# Patient Record
Sex: FEMALE | Race: WHITE | NOT HISPANIC OR LATINO | URBAN - METROPOLITAN AREA
[De-identification: names, ages, dates, MRNs, and addresses within clinical notes are randomized per-mention and may not be internally consistent; named-entity substitution may affect disease eponyms.]

---

## 2017-02-17 ENCOUNTER — AMBULATORY - HEALTHEAST (OUTPATIENT)
Dept: CARDIOLOGY | Facility: CLINIC | Age: 82
End: 2017-02-17

## 2017-02-17 ENCOUNTER — COMMUNICATION - HEALTHEAST (OUTPATIENT)
Dept: ADMINISTRATIVE | Facility: CLINIC | Age: 82
End: 2017-02-17

## 2017-02-17 DIAGNOSIS — I25.5 ISCHEMIC CARDIOMYOPATHY: ICD-10-CM

## 2017-02-17 DIAGNOSIS — I25.10 CAD (CORONARY ARTERY DISEASE): ICD-10-CM

## 2017-02-17 DIAGNOSIS — I48.91 A-FIB (H): ICD-10-CM

## 2017-03-29 ENCOUNTER — RECORDS - HEALTHEAST (OUTPATIENT)
Dept: LAB | Facility: CLINIC | Age: 82
End: 2017-03-29

## 2017-03-30 LAB — HBA1C MFR BLD: 5.8 % (ref 4.2–6.1)

## 2017-10-13 ENCOUNTER — RECORDS - HEALTHEAST (OUTPATIENT)
Dept: LAB | Facility: CLINIC | Age: 82
End: 2017-10-13

## 2017-10-16 LAB — HBA1C MFR BLD: 6.3 % (ref 4.2–6.1)

## 2018-01-24 ENCOUNTER — RECORDS - HEALTHEAST (OUTPATIENT)
Dept: LAB | Facility: CLINIC | Age: 83
End: 2018-01-24

## 2018-01-25 LAB — HBA1C MFR BLD: 6.7 % (ref 4.2–6.1)

## 2018-02-02 ENCOUNTER — RECORDS - HEALTHEAST (OUTPATIENT)
Dept: LAB | Facility: CLINIC | Age: 83
End: 2018-02-02

## 2018-02-02 LAB
ANION GAP SERPL CALCULATED.3IONS-SCNC: 11 MMOL/L (ref 5–18)
BNP SERPL-MCNC: 486 PG/ML (ref 0–167)
BUN SERPL-MCNC: 14 MG/DL (ref 8–28)
CALCIUM SERPL-MCNC: 8.9 MG/DL (ref 8.5–10.5)
CHLORIDE BLD-SCNC: 102 MMOL/L (ref 98–107)
CO2 SERPL-SCNC: 26 MMOL/L (ref 22–31)
CREAT SERPL-MCNC: 1.07 MG/DL (ref 0.6–1.1)
ERYTHROCYTE [DISTWIDTH] IN BLOOD BY AUTOMATED COUNT: 13.3 % (ref 11–14.5)
GFR SERPL CREATININE-BSD FRML MDRD: 49 ML/MIN/1.73M2
GLUCOSE BLD-MCNC: 131 MG/DL (ref 70–125)
HCT VFR BLD AUTO: 38 % (ref 35–47)
HGB BLD-MCNC: 11.5 G/DL (ref 12–16)
MCH RBC QN AUTO: 30.1 PG (ref 27–34)
MCHC RBC AUTO-ENTMCNC: 30.3 G/DL (ref 32–36)
MCV RBC AUTO: 100 FL (ref 80–100)
PLATELET # BLD AUTO: 227 THOU/UL (ref 140–440)
PMV BLD AUTO: 9.8 FL (ref 8.5–12.5)
POTASSIUM BLD-SCNC: 3.6 MMOL/L (ref 3.5–5)
RBC # BLD AUTO: 3.82 MILL/UL (ref 3.8–5.4)
SODIUM SERPL-SCNC: 139 MMOL/L (ref 136–145)
WBC: 10 THOU/UL (ref 4–11)

## 2018-02-07 ENCOUNTER — RECORDS - HEALTHEAST (OUTPATIENT)
Dept: LAB | Facility: CLINIC | Age: 83
End: 2018-02-07

## 2018-02-08 LAB — TSH SERPL DL<=0.005 MIU/L-ACNC: 5.93 UIU/ML (ref 0.3–5)

## 2018-02-27 ENCOUNTER — RECORDS - HEALTHEAST (OUTPATIENT)
Dept: LAB | Facility: CLINIC | Age: 83
End: 2018-02-27

## 2018-02-27 LAB
25(OH)D3 SERPL-MCNC: 43.5 NG/ML (ref 30–80)
ANION GAP SERPL CALCULATED.3IONS-SCNC: 7 MMOL/L (ref 5–18)
BASOPHILS # BLD AUTO: 0 THOU/UL (ref 0–0.2)
BASOPHILS NFR BLD AUTO: 0 % (ref 0–2)
BNP SERPL-MCNC: 312 PG/ML (ref 0–167)
BUN SERPL-MCNC: 15 MG/DL (ref 8–28)
CALCIUM SERPL-MCNC: 8.6 MG/DL (ref 8.5–10.5)
CHLORIDE BLD-SCNC: 105 MMOL/L (ref 98–107)
CO2 SERPL-SCNC: 26 MMOL/L (ref 22–31)
CREAT SERPL-MCNC: 1.02 MG/DL (ref 0.6–1.1)
EOSINOPHIL # BLD AUTO: 0.2 THOU/UL (ref 0–0.4)
EOSINOPHIL NFR BLD AUTO: 4 % (ref 0–6)
ERYTHROCYTE [DISTWIDTH] IN BLOOD BY AUTOMATED COUNT: 14 % (ref 11–14.5)
GFR SERPL CREATININE-BSD FRML MDRD: 52 ML/MIN/1.73M2
GLUCOSE BLD-MCNC: 88 MG/DL (ref 70–125)
HCT VFR BLD AUTO: 36.1 % (ref 35–47)
HGB BLD-MCNC: 11.5 G/DL (ref 12–16)
LYMPHOCYTES # BLD AUTO: 1.7 THOU/UL (ref 0.8–4.4)
LYMPHOCYTES NFR BLD AUTO: 32 % (ref 20–40)
MCH RBC QN AUTO: 30.3 PG (ref 27–34)
MCHC RBC AUTO-ENTMCNC: 31.9 G/DL (ref 32–36)
MCV RBC AUTO: 95 FL (ref 80–100)
MONOCYTES # BLD AUTO: 0.4 THOU/UL (ref 0–0.9)
MONOCYTES NFR BLD AUTO: 8 % (ref 2–10)
NEUTROPHILS # BLD AUTO: 2.9 THOU/UL (ref 2–7.7)
NEUTROPHILS NFR BLD AUTO: 56 % (ref 50–70)
OVALOCYTES: ABNORMAL
PLAT MORPH BLD: ABNORMAL
PLATELET # BLD AUTO: 134 THOU/UL (ref 140–440)
PMV BLD AUTO: 10.3 FL (ref 8.5–12.5)
POTASSIUM BLD-SCNC: 4.2 MMOL/L (ref 3.5–5)
RBC # BLD AUTO: 3.79 MILL/UL (ref 3.8–5.4)
SODIUM SERPL-SCNC: 138 MMOL/L (ref 136–145)
VIT B12 SERPL-MCNC: 399 PG/ML (ref 213–816)
WBC: 5.2 THOU/UL (ref 4–11)

## 2018-04-25 ENCOUNTER — RECORDS - HEALTHEAST (OUTPATIENT)
Dept: LAB | Facility: CLINIC | Age: 83
End: 2018-04-25

## 2018-04-26 LAB — TSH SERPL DL<=0.005 MIU/L-ACNC: 6.01 UIU/ML (ref 0.3–5)

## 2018-06-07 ENCOUNTER — RECORDS - HEALTHEAST (OUTPATIENT)
Dept: LAB | Facility: CLINIC | Age: 83
End: 2018-06-07

## 2018-06-07 LAB — TSH SERPL DL<=0.005 MIU/L-ACNC: 6.72 UIU/ML (ref 0.3–5)

## 2018-06-08 ENCOUNTER — RECORDS - HEALTHEAST (OUTPATIENT)
Dept: LAB | Facility: CLINIC | Age: 83
End: 2018-06-08

## 2018-06-08 LAB
ANION GAP SERPL CALCULATED.3IONS-SCNC: 7 MMOL/L (ref 5–18)
BNP SERPL-MCNC: 464 PG/ML (ref 0–167)
BUN SERPL-MCNC: 19 MG/DL (ref 8–28)
CALCIUM SERPL-MCNC: 8.6 MG/DL (ref 8.5–10.5)
CHLORIDE BLD-SCNC: 107 MMOL/L (ref 98–107)
CO2 SERPL-SCNC: 28 MMOL/L (ref 22–31)
CREAT SERPL-MCNC: 1.14 MG/DL (ref 0.6–1.1)
GFR SERPL CREATININE-BSD FRML MDRD: 46 ML/MIN/1.73M2
GLUCOSE BLD-MCNC: 110 MG/DL (ref 70–125)
POTASSIUM BLD-SCNC: 4.1 MMOL/L (ref 3.5–5)
SODIUM SERPL-SCNC: 142 MMOL/L (ref 136–145)
T3 SERPL-MCNC: 77 NG/DL (ref 45–175)
T4 FREE SERPL-MCNC: 0.8 NG/DL (ref 0.7–1.8)
T4 TOTAL - HISTORICAL: 4.6 UG/DL (ref 4.5–13)
TSH SERPL DL<=0.005 MIU/L-ACNC: 6.3 UIU/ML (ref 0.3–5)
TSH SERPL DL<=0.005 MIU/L-ACNC: 6.3 UIU/ML (ref 0.3–5)

## 2018-08-15 ENCOUNTER — RECORDS - HEALTHEAST (OUTPATIENT)
Dept: LAB | Facility: CLINIC | Age: 83
End: 2018-08-15

## 2018-08-16 LAB — TSH SERPL DL<=0.005 MIU/L-ACNC: 14.78 UIU/ML (ref 0.3–5)

## 2018-08-17 LAB — HBA1C MFR BLD: 7.5 % (ref 4.2–6.1)

## 2018-08-27 ENCOUNTER — RECORDS - HEALTHEAST (OUTPATIENT)
Dept: LAB | Facility: CLINIC | Age: 83
End: 2018-08-27

## 2018-08-27 LAB
ANION GAP SERPL CALCULATED.3IONS-SCNC: 7 MMOL/L (ref 5–18)
BASOPHILS # BLD AUTO: 0 THOU/UL (ref 0–0.2)
BASOPHILS NFR BLD AUTO: 1 % (ref 0–2)
BUN SERPL-MCNC: 19 MG/DL (ref 8–28)
CALCIUM SERPL-MCNC: 9.4 MG/DL (ref 8.5–10.5)
CHLORIDE BLD-SCNC: 105 MMOL/L (ref 98–107)
CO2 SERPL-SCNC: 30 MMOL/L (ref 22–31)
CREAT SERPL-MCNC: 1.19 MG/DL (ref 0.6–1.1)
EOSINOPHIL # BLD AUTO: 0.4 THOU/UL (ref 0–0.4)
EOSINOPHIL NFR BLD AUTO: 6 % (ref 0–6)
ERYTHROCYTE [DISTWIDTH] IN BLOOD BY AUTOMATED COUNT: 14.6 % (ref 11–14.5)
GFR SERPL CREATININE-BSD FRML MDRD: 43 ML/MIN/1.73M2
GLUCOSE BLD-MCNC: 250 MG/DL (ref 70–125)
HCT VFR BLD AUTO: 39.9 % (ref 35–47)
HGB BLD-MCNC: 12.1 G/DL (ref 12–16)
LYMPHOCYTES # BLD AUTO: 1.6 THOU/UL (ref 0.8–4.4)
LYMPHOCYTES NFR BLD AUTO: 23 % (ref 20–40)
MCH RBC QN AUTO: 29.6 PG (ref 27–34)
MCHC RBC AUTO-ENTMCNC: 30.3 G/DL (ref 32–36)
MCV RBC AUTO: 98 FL (ref 80–100)
MONOCYTES # BLD AUTO: 0.6 THOU/UL (ref 0–0.9)
MONOCYTES NFR BLD AUTO: 8 % (ref 2–10)
NEUTROPHILS # BLD AUTO: 4.5 THOU/UL (ref 2–7.7)
NEUTROPHILS NFR BLD AUTO: 63 % (ref 50–70)
PLATELET # BLD AUTO: 201 THOU/UL (ref 140–440)
PMV BLD AUTO: 10.3 FL (ref 8.5–12.5)
POTASSIUM BLD-SCNC: 4 MMOL/L (ref 3.5–5)
RBC # BLD AUTO: 4.09 MILL/UL (ref 3.8–5.4)
SODIUM SERPL-SCNC: 142 MMOL/L (ref 136–145)
WBC: 7.2 THOU/UL (ref 4–11)

## 2018-09-26 ENCOUNTER — COMMUNICATION - HEALTHEAST (OUTPATIENT)
Dept: NEUROSURGERY | Facility: CLINIC | Age: 83
End: 2018-09-26

## 2018-09-26 DIAGNOSIS — S22.009S MULT FRACTURES OF THORACIC SPINE, CLOSED, SEQUELA: ICD-10-CM

## 2018-09-27 ENCOUNTER — OFFICE VISIT - HEALTHEAST (OUTPATIENT)
Dept: GERIATRICS | Facility: CLINIC | Age: 83
End: 2018-09-27

## 2018-09-27 DIAGNOSIS — E11.42 DIABETIC POLYNEUROPATHY ASSOCIATED WITH TYPE 2 DIABETES MELLITUS (H): ICD-10-CM

## 2018-09-27 DIAGNOSIS — F32.4 MAJOR DEPRESSIVE DISORDER WITH SINGLE EPISODE, IN PARTIAL REMISSION (H): ICD-10-CM

## 2018-09-27 DIAGNOSIS — I10 HYPERTENSION: ICD-10-CM

## 2018-09-27 DIAGNOSIS — I25.10 CORONARY ATHEROSCLEROSIS: ICD-10-CM

## 2018-09-27 DIAGNOSIS — H34.9 RETINAL ARTERY OCCLUSION: ICD-10-CM

## 2018-09-27 DIAGNOSIS — S22.050A: ICD-10-CM

## 2018-09-27 DIAGNOSIS — S42.201D CLOSED FRACTURE OF PROXIMAL END OF RIGHT HUMERUS WITH ROUTINE HEALING, UNSPECIFIED FRACTURE MORPHOLOGY, SUBSEQUENT ENCOUNTER: ICD-10-CM

## 2018-09-27 DIAGNOSIS — I50.22 CHRONIC SYSTOLIC HEART FAILURE (H): ICD-10-CM

## 2018-09-27 DIAGNOSIS — E11.65 TYPE 2 DIABETES MELLITUS WITH HYPERGLYCEMIA, WITHOUT LONG-TERM CURRENT USE OF INSULIN (H): ICD-10-CM

## 2018-09-28 ENCOUNTER — OFFICE VISIT - HEALTHEAST (OUTPATIENT)
Dept: GERIATRICS | Facility: CLINIC | Age: 83
End: 2018-09-28

## 2018-09-28 DIAGNOSIS — F32.4 MAJOR DEPRESSIVE DISORDER WITH SINGLE EPISODE, IN PARTIAL REMISSION (H): ICD-10-CM

## 2018-09-28 DIAGNOSIS — R53.81 PHYSICAL DECONDITIONING: ICD-10-CM
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
## 2018-09-28 DIAGNOSIS — I25.10 CORONARY ATHEROSCLEROSIS: ICD-10-CM

## 2018-09-28 DIAGNOSIS — E11.9 DM (DIABETES MELLITUS) (H): ICD-10-CM
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
## 2018-09-28 DIAGNOSIS — I10 HYPERTENSION: ICD-10-CM

## 2018-09-28 DIAGNOSIS — E11.42 DIABETIC POLYNEUROPATHY ASSOCIATED WITH TYPE 2 DIABETES MELLITUS (H): ICD-10-CM

## 2018-09-28 DIAGNOSIS — E87.6 HYPOKALEMIA: ICD-10-CM

## 2018-09-28 DIAGNOSIS — J44.9 COPD (CHRONIC OBSTRUCTIVE PULMONARY DISEASE) (H): ICD-10-CM

## 2018-09-28 DIAGNOSIS — N18.2 CHRONIC KIDNEY DISEASE, STAGE II (MILD): ICD-10-CM

## 2018-09-28 DIAGNOSIS — S42.201D CLOSED FRACTURE OF PROXIMAL END OF RIGHT HUMERUS WITH ROUTINE HEALING, UNSPECIFIED FRACTURE MORPHOLOGY, SUBSEQUENT ENCOUNTER: ICD-10-CM

## 2018-09-28 DIAGNOSIS — I50.22 CHRONIC SYSTOLIC HEART FAILURE (H): ICD-10-CM

## 2018-09-28 DIAGNOSIS — K58.9 IRRITABLE BOWEL SYNDROME: ICD-10-CM

## 2018-09-28 DIAGNOSIS — I25.2 OLD MYOCARDIAL INFARCTION: ICD-10-CM

## 2018-09-28 DIAGNOSIS — S22.000A CLOSED COMPRESSION FRACTURE OF THORACIC VERTEBRA, INITIAL ENCOUNTER (H): ICD-10-CM

## 2018-09-28 DIAGNOSIS — D50.9 IRON DEFICIENCY ANEMIA: ICD-10-CM

## 2018-10-01 ENCOUNTER — RECORDS - HEALTHEAST (OUTPATIENT)
Dept: LAB | Facility: CLINIC | Age: 83
End: 2018-10-01

## 2018-10-01 LAB
25(OH)D3 SERPL-MCNC: 32.7 NG/ML (ref 30–80)
ANION GAP SERPL CALCULATED.3IONS-SCNC: 11 MMOL/L (ref 5–18)
BASOPHILS # BLD AUTO: 0 THOU/UL (ref 0–0.2)
BASOPHILS NFR BLD AUTO: 0 % (ref 0–2)
BUN SERPL-MCNC: 23 MG/DL (ref 8–28)
CALCIUM SERPL-MCNC: 9 MG/DL (ref 8.5–10.5)
CHLORIDE BLD-SCNC: 103 MMOL/L (ref 98–107)
CO2 SERPL-SCNC: 27 MMOL/L (ref 22–31)
CREAT SERPL-MCNC: 0.99 MG/DL (ref 0.6–1.1)
EOSINOPHIL # BLD AUTO: 0.3 THOU/UL (ref 0–0.4)
EOSINOPHIL NFR BLD AUTO: 4 % (ref 0–6)
ERYTHROCYTE [DISTWIDTH] IN BLOOD BY AUTOMATED COUNT: 15.6 % (ref 11–14.5)
GFR SERPL CREATININE-BSD FRML MDRD: 53 ML/MIN/1.73M2
GLUCOSE BLD-MCNC: 124 MG/DL (ref 70–125)
HCT VFR BLD AUTO: 33.2 % (ref 35–47)
HGB BLD-MCNC: 10.4 G/DL (ref 12–16)
LYMPHOCYTES # BLD AUTO: 1.8 THOU/UL (ref 0.8–4.4)
LYMPHOCYTES NFR BLD AUTO: 24 % (ref 20–40)
MCH RBC QN AUTO: 30.1 PG (ref 27–34)
MCHC RBC AUTO-ENTMCNC: 31.3 G/DL (ref 32–36)
MCV RBC AUTO: 96 FL (ref 80–100)
MONOCYTES # BLD AUTO: 0.7 THOU/UL (ref 0–0.9)
MONOCYTES NFR BLD AUTO: 9 % (ref 2–10)
NEUTROPHILS # BLD AUTO: 4.7 THOU/UL (ref 2–7.7)
NEUTROPHILS NFR BLD AUTO: 63 % (ref 50–70)
PLATELET # BLD AUTO: 250 THOU/UL (ref 140–440)
PMV BLD AUTO: 10 FL (ref 8.5–12.5)
POTASSIUM BLD-SCNC: 4.3 MMOL/L (ref 3.5–5)
RBC # BLD AUTO: 3.45 MILL/UL (ref 3.8–5.4)
SODIUM SERPL-SCNC: 141 MMOL/L (ref 136–145)
WBC: 7.6 THOU/UL (ref 4–11)

## 2018-10-02 ENCOUNTER — RECORDS - HEALTHEAST (OUTPATIENT)
Dept: LAB | Facility: CLINIC | Age: 83
End: 2018-10-02

## 2018-10-02 ENCOUNTER — OFFICE VISIT - HEALTHEAST (OUTPATIENT)
Dept: GERIATRICS | Facility: CLINIC | Age: 83
End: 2018-10-02

## 2018-10-02 DIAGNOSIS — I50.22 CHRONIC SYSTOLIC HEART FAILURE (H): ICD-10-CM

## 2018-10-02 DIAGNOSIS — I25.10 CORONARY ATHEROSCLEROSIS: ICD-10-CM

## 2018-10-02 DIAGNOSIS — D50.9 IRON DEFICIENCY ANEMIA: ICD-10-CM

## 2018-10-02 DIAGNOSIS — S42.201D CLOSED FRACTURE OF PROXIMAL END OF RIGHT HUMERUS WITH ROUTINE HEALING, UNSPECIFIED FRACTURE MORPHOLOGY, SUBSEQUENT ENCOUNTER: ICD-10-CM

## 2018-10-02 DIAGNOSIS — E11.65 TYPE 2 DIABETES MELLITUS WITH HYPERGLYCEMIA, WITHOUT LONG-TERM CURRENT USE OF INSULIN (H): ICD-10-CM

## 2018-10-02 DIAGNOSIS — I10 HYPERTENSION: ICD-10-CM
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
## 2018-10-02 DIAGNOSIS — E11.42 DIABETIC POLYNEUROPATHY ASSOCIATED WITH TYPE 2 DIABETES MELLITUS (H): ICD-10-CM

## 2018-10-02 DIAGNOSIS — N18.2 CHRONIC KIDNEY DISEASE, STAGE II (MILD): ICD-10-CM

## 2018-10-02 DIAGNOSIS — I48.91 ATRIAL FIBRILLATION (H): ICD-10-CM

## 2018-10-02 DIAGNOSIS — K58.9 IRRITABLE BOWEL SYNDROME: ICD-10-CM
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
## 2018-10-02 DIAGNOSIS — H40.9 GLAUCOMA: ICD-10-CM

## 2018-10-02 DIAGNOSIS — F32.4 MAJOR DEPRESSIVE DISORDER WITH SINGLE EPISODE, IN PARTIAL REMISSION (H): ICD-10-CM

## 2018-10-02 DIAGNOSIS — R53.81 PHYSICAL DECONDITIONING: ICD-10-CM

## 2018-10-02 DIAGNOSIS — S22.000A CLOSED COMPRESSION FRACTURE OF THORACIC VERTEBRA, INITIAL ENCOUNTER (H): ICD-10-CM

## 2018-10-02 DIAGNOSIS — J44.9 COPD (CHRONIC OBSTRUCTIVE PULMONARY DISEASE) (H): ICD-10-CM

## 2018-10-02 LAB — TSH SERPL DL<=0.005 MIU/L-ACNC: 2.73 UIU/ML (ref 0.3–5)

## 2018-10-04 ENCOUNTER — OFFICE VISIT - HEALTHEAST (OUTPATIENT)
Dept: GERIATRICS | Facility: CLINIC | Age: 83
End: 2018-10-04
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
## 2018-10-04 DIAGNOSIS — D50.9 IRON DEFICIENCY ANEMIA: ICD-10-CM

## 2018-10-04 DIAGNOSIS — N18.2 CHRONIC KIDNEY DISEASE, STAGE II (MILD): ICD-10-CM

## 2018-10-04 DIAGNOSIS — J44.9 COPD (CHRONIC OBSTRUCTIVE PULMONARY DISEASE) (H): ICD-10-CM

## 2018-10-04 DIAGNOSIS — S42.201D CLOSED FRACTURE OF PROXIMAL END OF RIGHT HUMERUS WITH ROUTINE HEALING, UNSPECIFIED FRACTURE MORPHOLOGY, SUBSEQUENT ENCOUNTER: ICD-10-CM

## 2018-10-04 DIAGNOSIS — E11.65 TYPE 2 DIABETES MELLITUS WITH HYPERGLYCEMIA, WITHOUT LONG-TERM CURRENT USE OF INSULIN (H): ICD-10-CM

## 2018-10-04 DIAGNOSIS — I25.2 OLD MYOCARDIAL INFARCTION: ICD-10-CM

## 2018-10-04 DIAGNOSIS — R53.81 PHYSICAL DECONDITIONING: ICD-10-CM
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
## 2018-10-04 DIAGNOSIS — I48.91 ATRIAL FIBRILLATION (H): ICD-10-CM

## 2018-10-04 DIAGNOSIS — I25.10 CORONARY ATHEROSCLEROSIS: ICD-10-CM

## 2018-10-04 DIAGNOSIS — S22.000A CLOSED COMPRESSION FRACTURE OF THORACIC VERTEBRA, INITIAL ENCOUNTER (H): ICD-10-CM

## 2018-10-04 DIAGNOSIS — I50.22 CHRONIC SYSTOLIC HEART FAILURE (H): ICD-10-CM

## 2018-10-04 DIAGNOSIS — I10 HYPERTENSION: ICD-10-CM

## 2018-10-09 ENCOUNTER — OFFICE VISIT - HEALTHEAST (OUTPATIENT)
Dept: GERIATRICS | Facility: CLINIC | Age: 83
End: 2018-10-09

## 2018-10-09 DIAGNOSIS — J44.9 COPD (CHRONIC OBSTRUCTIVE PULMONARY DISEASE) (H): ICD-10-CM

## 2018-10-09 DIAGNOSIS — I25.10 CORONARY ATHEROSCLEROSIS: ICD-10-CM

## 2018-10-09 DIAGNOSIS — I10 HYPERTENSION: ICD-10-CM

## 2018-10-09 DIAGNOSIS — F32.4 MAJOR DEPRESSIVE DISORDER WITH SINGLE EPISODE, IN PARTIAL REMISSION (H): ICD-10-CM
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
## 2018-10-09 DIAGNOSIS — I50.22 CHRONIC SYSTOLIC HEART FAILURE (H): ICD-10-CM

## 2018-10-09 DIAGNOSIS — S42.201D CLOSED FRACTURE OF PROXIMAL END OF RIGHT HUMERUS WITH ROUTINE HEALING, UNSPECIFIED FRACTURE MORPHOLOGY, SUBSEQUENT ENCOUNTER: ICD-10-CM

## 2018-10-09 DIAGNOSIS — E11.65 TYPE 2 DIABETES MELLITUS WITH HYPERGLYCEMIA, WITHOUT LONG-TERM CURRENT USE OF INSULIN (H): ICD-10-CM

## 2018-10-09 DIAGNOSIS — S22.000A CLOSED COMPRESSION FRACTURE OF THORACIC VERTEBRA, INITIAL ENCOUNTER (H): ICD-10-CM

## 2018-10-09 DIAGNOSIS — K58.9 IRRITABLE BOWEL SYNDROME: ICD-10-CM

## 2018-10-09 DIAGNOSIS — N18.2 CHRONIC KIDNEY DISEASE, STAGE II (MILD): ICD-10-CM
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
## 2018-10-09 DIAGNOSIS — R53.81 PHYSICAL DECONDITIONING: ICD-10-CM

## 2018-10-11 ENCOUNTER — OFFICE VISIT - HEALTHEAST (OUTPATIENT)
Dept: GERIATRICS | Facility: CLINIC | Age: 83
End: 2018-10-11

## 2018-10-11 DIAGNOSIS — R53.81 PHYSICAL DECONDITIONING: ICD-10-CM

## 2018-10-11 DIAGNOSIS — T14.8XXA EXCORIATION: ICD-10-CM
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
## 2018-10-11 DIAGNOSIS — K58.9 IRRITABLE BOWEL SYNDROME: ICD-10-CM

## 2018-10-11 DIAGNOSIS — I48.91 ATRIAL FIBRILLATION (H): ICD-10-CM
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
## 2018-10-11 DIAGNOSIS — J44.9 COPD (CHRONIC OBSTRUCTIVE PULMONARY DISEASE) (H): ICD-10-CM

## 2018-10-11 DIAGNOSIS — S22.000A CLOSED COMPRESSION FRACTURE OF THORACIC VERTEBRA, INITIAL ENCOUNTER (H): ICD-10-CM

## 2018-10-11 DIAGNOSIS — S42.201D CLOSED FRACTURE OF PROXIMAL END OF RIGHT HUMERUS WITH ROUTINE HEALING, UNSPECIFIED FRACTURE MORPHOLOGY, SUBSEQUENT ENCOUNTER: ICD-10-CM

## 2018-10-11 DIAGNOSIS — I50.22 CHRONIC SYSTOLIC HEART FAILURE (H): ICD-10-CM

## 2018-10-11 DIAGNOSIS — F32.4 MAJOR DEPRESSIVE DISORDER WITH SINGLE EPISODE, IN PARTIAL REMISSION (H): ICD-10-CM

## 2018-10-11 DIAGNOSIS — E11.65 TYPE 2 DIABETES MELLITUS WITH HYPERGLYCEMIA, WITHOUT LONG-TERM CURRENT USE OF INSULIN (H): ICD-10-CM

## 2018-10-11 DIAGNOSIS — I10 HYPERTENSION: ICD-10-CM

## 2018-10-11 DIAGNOSIS — I25.10 CORONARY ATHEROSCLEROSIS: ICD-10-CM

## 2018-10-11 DIAGNOSIS — N18.2 CHRONIC KIDNEY DISEASE, STAGE II (MILD): ICD-10-CM

## 2018-10-11 DIAGNOSIS — E11.42 DIABETIC POLYNEUROPATHY ASSOCIATED WITH TYPE 2 DIABETES MELLITUS (H): ICD-10-CM

## 2018-10-12 ENCOUNTER — HOSPITAL ENCOUNTER (OUTPATIENT)
Dept: RADIOLOGY | Facility: CLINIC | Age: 83
Discharge: HOME | End: 2018-10-12
Attending: SURGERY

## 2018-10-12 ENCOUNTER — OFFICE VISIT - HEALTHEAST (OUTPATIENT)
Dept: GERIATRICS | Facility: CLINIC | Age: 83
End: 2018-10-12

## 2018-10-12 ENCOUNTER — OFFICE VISIT - HEALTHEAST (OUTPATIENT)
Dept: NEUROSURGERY | Facility: CLINIC | Age: 83
End: 2018-10-12
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
## 2018-10-12 DIAGNOSIS — J44.9 COPD (CHRONIC OBSTRUCTIVE PULMONARY DISEASE) (H): ICD-10-CM

## 2018-10-12 DIAGNOSIS — E44.0 MODERATE MALNUTRITION (H): ICD-10-CM

## 2018-10-12 DIAGNOSIS — S22.000A CLOSED COMPRESSION FRACTURE OF THORACIC VERTEBRA, INITIAL ENCOUNTER (H): ICD-10-CM

## 2018-10-12 DIAGNOSIS — R53.81 PHYSICAL DECONDITIONING: ICD-10-CM

## 2018-10-12 DIAGNOSIS — S22.000D CLOSED COMPRESSION FRACTURE OF THORACIC VERTEBRA WITH ROUTINE HEALING, SUBSEQUENT ENCOUNTER: ICD-10-CM
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
## 2018-10-12 DIAGNOSIS — I10 HYPERTENSION: ICD-10-CM

## 2018-10-12 DIAGNOSIS — I48.91 ATRIAL FIBRILLATION (H): ICD-10-CM

## 2018-10-12 DIAGNOSIS — N18.2 CHRONIC KIDNEY DISEASE, STAGE II (MILD): ICD-10-CM

## 2018-10-12 DIAGNOSIS — E78.5 DYSLIPIDEMIA, GOAL LDL BELOW 70: ICD-10-CM

## 2018-10-12 DIAGNOSIS — I50.22 CHRONIC SYSTOLIC HEART FAILURE (H): ICD-10-CM

## 2018-10-12 DIAGNOSIS — S22.009S MULT FRACTURES OF THORACIC SPINE, CLOSED, SEQUELA: ICD-10-CM

## 2018-10-12 DIAGNOSIS — S42.201D CLOSED FRACTURE OF PROXIMAL END OF RIGHT HUMERUS WITH ROUTINE HEALING, UNSPECIFIED FRACTURE MORPHOLOGY, SUBSEQUENT ENCOUNTER: ICD-10-CM

## 2018-10-12 ASSESSMENT — MIFFLIN-ST. JEOR: SCORE: 1010.14

## 2018-10-15 ENCOUNTER — AMBULATORY - HEALTHEAST (OUTPATIENT)
Dept: GERIATRICS | Facility: CLINIC | Age: 83
End: 2018-10-15

## 2018-11-09 ENCOUNTER — RECORDS - HEALTHEAST (OUTPATIENT)
Dept: LAB | Facility: CLINIC | Age: 83
End: 2018-11-09

## 2018-11-12 ENCOUNTER — HOSPITAL ENCOUNTER (OUTPATIENT)
Dept: RADIOLOGY | Facility: CLINIC | Age: 83
Discharge: HOME | End: 2018-11-12

## 2018-11-12 ENCOUNTER — COMMUNICATION - HEALTHEAST (OUTPATIENT)
Dept: TELEHEALTH | Facility: CLINIC | Age: 83
End: 2018-11-12

## 2018-11-12 ENCOUNTER — OFFICE VISIT - HEALTHEAST (OUTPATIENT)
Dept: NEUROSURGERY | Facility: CLINIC | Age: 83
End: 2018-11-12

## 2018-11-12 DIAGNOSIS — S22.000D CLOSED COMPRESSION FRACTURE OF THORACIC VERTEBRA WITH ROUTINE HEALING, SUBSEQUENT ENCOUNTER: ICD-10-CM

## 2018-11-12 LAB — TSH SERPL DL<=0.005 MIU/L-ACNC: 3.88 UIU/ML (ref 0.3–5)

## 2018-11-12 ASSESSMENT — MIFFLIN-ST. JEOR: SCORE: 1010.14

## 2019-01-18 ENCOUNTER — RECORDS - HEALTHEAST (OUTPATIENT)
Dept: LAB | Facility: CLINIC | Age: 84
End: 2019-01-18

## 2019-01-21 LAB
ANION GAP SERPL CALCULATED.3IONS-SCNC: 12 MMOL/L (ref 5–18)
BASOPHILS # BLD AUTO: 0.1 THOU/UL (ref 0–0.2)
BASOPHILS NFR BLD AUTO: 1 % (ref 0–2)
BUN SERPL-MCNC: 21 MG/DL (ref 8–28)
CALCIUM SERPL-MCNC: 9.3 MG/DL (ref 8.5–10.5)
CHLORIDE BLD-SCNC: 101 MMOL/L (ref 98–107)
CO2 SERPL-SCNC: 29 MMOL/L (ref 22–31)
CREAT SERPL-MCNC: 1.69 MG/DL (ref 0.6–1.1)
EOSINOPHIL # BLD AUTO: 0.5 THOU/UL (ref 0–0.4)
EOSINOPHIL NFR BLD AUTO: 8 % (ref 0–6)
ERYTHROCYTE [DISTWIDTH] IN BLOOD BY AUTOMATED COUNT: 14.8 % (ref 11–14.5)
GFR SERPL CREATININE-BSD FRML MDRD: 29 ML/MIN/1.73M2
GLUCOSE BLD-MCNC: 174 MG/DL (ref 70–125)
HBA1C MFR BLD: 8 % (ref 4.2–6.1)
HCT VFR BLD AUTO: 39.6 % (ref 35–47)
HGB BLD-MCNC: 11.6 G/DL (ref 12–16)
LYMPHOCYTES # BLD AUTO: 1.4 THOU/UL (ref 0.8–4.4)
LYMPHOCYTES NFR BLD AUTO: 21 % (ref 20–40)
MAGNESIUM SERPL-MCNC: 2 MG/DL (ref 1.8–2.6)
MCH RBC QN AUTO: 26 PG (ref 27–34)
MCHC RBC AUTO-ENTMCNC: 29.3 G/DL (ref 32–36)
MCV RBC AUTO: 89 FL (ref 80–100)
MONOCYTES # BLD AUTO: 0.7 THOU/UL (ref 0–0.9)
MONOCYTES NFR BLD AUTO: 11 % (ref 2–10)
NEUTROPHILS # BLD AUTO: 3.9 THOU/UL (ref 2–7.7)
NEUTROPHILS NFR BLD AUTO: 59 % (ref 50–70)
PLATELET # BLD AUTO: 206 THOU/UL (ref 140–440)
PMV BLD AUTO: 9.9 FL (ref 8.5–12.5)
POTASSIUM BLD-SCNC: 4.2 MMOL/L (ref 3.5–5)
RBC # BLD AUTO: 4.47 MILL/UL (ref 3.8–5.4)
SODIUM SERPL-SCNC: 142 MMOL/L (ref 136–145)
WBC: 6.6 THOU/UL (ref 4–11)

## 2019-03-21 ENCOUNTER — RECORDS - HEALTHEAST (OUTPATIENT)
Dept: LAB | Facility: CLINIC | Age: 84
End: 2019-03-21

## 2019-03-21 LAB
MAGNESIUM SERPL-MCNC: 2.1 MG/DL (ref 1.8–2.6)
POTASSIUM BLD-SCNC: 4.9 MMOL/L (ref 3.5–5)

## 2019-06-17 ENCOUNTER — RECORDS - HEALTHEAST (OUTPATIENT)
Dept: LAB | Facility: CLINIC | Age: 84
End: 2019-06-17

## 2019-06-17 LAB
ANION GAP SERPL CALCULATED.3IONS-SCNC: 9 MMOL/L (ref 5–18)
BUN SERPL-MCNC: 30 MG/DL (ref 8–28)
CALCIUM SERPL-MCNC: 9.7 MG/DL (ref 8.5–10.5)
CHLORIDE BLD-SCNC: 104 MMOL/L (ref 98–107)
CO2 SERPL-SCNC: 28 MMOL/L (ref 22–31)
CREAT SERPL-MCNC: 1.73 MG/DL (ref 0.6–1.1)
ERYTHROCYTE [DISTWIDTH] IN BLOOD BY AUTOMATED COUNT: 16.7 % (ref 11–14.5)
GFR SERPL CREATININE-BSD FRML MDRD: 28 ML/MIN/1.73M2
GLUCOSE BLD-MCNC: 137 MG/DL (ref 70–125)
HBA1C MFR BLD: 6.9 % (ref 4.2–6.1)
HCT VFR BLD AUTO: 37.1 % (ref 35–47)
HGB BLD-MCNC: 10.8 G/DL (ref 12–16)
MCH RBC QN AUTO: 25.6 PG (ref 27–34)
MCHC RBC AUTO-ENTMCNC: 29.1 G/DL (ref 32–36)
MCV RBC AUTO: 88 FL (ref 80–100)
PLATELET # BLD AUTO: 177 THOU/UL (ref 140–440)
PMV BLD AUTO: 10.4 FL (ref 8.5–12.5)
POTASSIUM BLD-SCNC: 4 MMOL/L (ref 3.5–5)
RBC # BLD AUTO: 4.22 MILL/UL (ref 3.8–5.4)
SODIUM SERPL-SCNC: 141 MMOL/L (ref 136–145)
TSH SERPL DL<=0.005 MIU/L-ACNC: 5.89 UIU/ML (ref 0.3–5)
WBC: 4.9 THOU/UL (ref 4–11)

## 2019-08-14 ENCOUNTER — RECORDS - HEALTHEAST (OUTPATIENT)
Dept: LAB | Facility: CLINIC | Age: 84
End: 2019-08-14

## 2019-08-15 LAB
ALBUMIN SERPL-MCNC: 3.3 G/DL (ref 3.5–5)
ALP SERPL-CCNC: 174 U/L (ref 45–120)
ALT SERPL W P-5'-P-CCNC: 11 U/L (ref 0–45)
ANION GAP SERPL CALCULATED.3IONS-SCNC: 8 MMOL/L (ref 5–18)
AST SERPL W P-5'-P-CCNC: 21 U/L (ref 0–40)
BILIRUB SERPL-MCNC: 0.3 MG/DL (ref 0–1)
BUN SERPL-MCNC: 26 MG/DL (ref 8–28)
CALCIUM SERPL-MCNC: 9.3 MG/DL (ref 8.5–10.5)
CHLORIDE BLD-SCNC: 103 MMOL/L (ref 98–107)
CO2 SERPL-SCNC: 30 MMOL/L (ref 22–31)
CREAT SERPL-MCNC: 1.86 MG/DL (ref 0.6–1.1)
ERYTHROCYTE [DISTWIDTH] IN BLOOD BY AUTOMATED COUNT: 18.5 % (ref 11–14.5)
GFR SERPL CREATININE-BSD FRML MDRD: 26 ML/MIN/1.73M2
GLUCOSE BLD-MCNC: 105 MG/DL (ref 70–125)
HCT VFR BLD AUTO: 35.7 % (ref 35–47)
HGB BLD-MCNC: 10.4 G/DL (ref 12–16)
MCH RBC QN AUTO: 25.3 PG (ref 27–34)
MCHC RBC AUTO-ENTMCNC: 29.1 G/DL (ref 32–36)
MCV RBC AUTO: 87 FL (ref 80–100)
PLATELET # BLD AUTO: 177 THOU/UL (ref 140–440)
PMV BLD AUTO: 9.7 FL (ref 8.5–12.5)
POTASSIUM BLD-SCNC: 4.2 MMOL/L (ref 3.5–5)
PROT SERPL-MCNC: 7 G/DL (ref 6–8)
RBC # BLD AUTO: 4.11 MILL/UL (ref 3.8–5.4)
SODIUM SERPL-SCNC: 141 MMOL/L (ref 136–145)
TSH SERPL DL<=0.005 MIU/L-ACNC: 3.61 UIU/ML (ref 0.3–5)
WBC: 5.2 THOU/UL (ref 4–11)

## 2019-08-23 ENCOUNTER — RECORDS - HEALTHEAST (OUTPATIENT)
Dept: LAB | Facility: CLINIC | Age: 84
End: 2019-08-23

## 2019-08-26 LAB
ANION GAP SERPL CALCULATED.3IONS-SCNC: 8 MMOL/L (ref 5–18)
BUN SERPL-MCNC: 18 MG/DL (ref 8–28)
CALCIUM SERPL-MCNC: 8.9 MG/DL (ref 8.5–10.5)
CHLORIDE BLD-SCNC: 105 MMOL/L (ref 98–107)
CO2 SERPL-SCNC: 27 MMOL/L (ref 22–31)
CREAT SERPL-MCNC: 1.7 MG/DL (ref 0.6–1.1)
GFR SERPL CREATININE-BSD FRML MDRD: 29 ML/MIN/1.73M2
GLUCOSE BLD-MCNC: 168 MG/DL (ref 70–125)
POTASSIUM BLD-SCNC: 4.2 MMOL/L (ref 3.5–5)
SODIUM SERPL-SCNC: 140 MMOL/L (ref 136–145)

## 2019-09-10 ENCOUNTER — RECORDS - HEALTHEAST (OUTPATIENT)
Dept: LAB | Facility: CLINIC | Age: 84
End: 2019-09-10

## 2019-09-10 LAB
ANION GAP SERPL CALCULATED.3IONS-SCNC: 9 MMOL/L (ref 5–18)
BNP SERPL-MCNC: 1089 PG/ML (ref 0–167)
BUN SERPL-MCNC: 23 MG/DL (ref 8–28)
CALCIUM SERPL-MCNC: 8.9 MG/DL (ref 8.5–10.5)
CHLORIDE BLD-SCNC: 107 MMOL/L (ref 98–107)
CO2 SERPL-SCNC: 26 MMOL/L (ref 22–31)
CREAT SERPL-MCNC: 1.66 MG/DL (ref 0.6–1.1)
GFR SERPL CREATININE-BSD FRML MDRD: 29 ML/MIN/1.73M2
GLUCOSE BLD-MCNC: 224 MG/DL (ref 70–125)
POTASSIUM BLD-SCNC: 3.9 MMOL/L (ref 3.5–5)
SODIUM SERPL-SCNC: 142 MMOL/L (ref 136–145)

## 2019-09-17 ENCOUNTER — RECORDS - HEALTHEAST (OUTPATIENT)
Dept: LAB | Facility: CLINIC | Age: 84
End: 2019-09-17

## 2019-09-17 LAB
ANION GAP SERPL CALCULATED.3IONS-SCNC: 8 MMOL/L (ref 5–18)
BNP SERPL-MCNC: 832 PG/ML (ref 0–167)
BUN SERPL-MCNC: 17 MG/DL (ref 8–28)
CALCIUM SERPL-MCNC: 9.1 MG/DL (ref 8.5–10.5)
CHLORIDE BLD-SCNC: 105 MMOL/L (ref 98–107)
CO2 SERPL-SCNC: 26 MMOL/L (ref 22–31)
CREAT SERPL-MCNC: 1.58 MG/DL (ref 0.6–1.1)
GFR SERPL CREATININE-BSD FRML MDRD: 31 ML/MIN/1.73M2
GLUCOSE BLD-MCNC: 112 MG/DL (ref 70–125)
POTASSIUM BLD-SCNC: 4.3 MMOL/L (ref 3.5–5)
SODIUM SERPL-SCNC: 139 MMOL/L (ref 136–145)

## 2019-10-29 ENCOUNTER — RECORDS - HEALTHEAST (OUTPATIENT)
Dept: LAB | Facility: CLINIC | Age: 84
End: 2019-10-29

## 2019-10-29 LAB
ANION GAP SERPL CALCULATED.3IONS-SCNC: 7 MMOL/L (ref 5–18)
BASOPHILS # BLD AUTO: 0 THOU/UL (ref 0–0.2)
BASOPHILS NFR BLD AUTO: 1 % (ref 0–2)
BUN SERPL-MCNC: 15 MG/DL (ref 8–28)
CALCIUM SERPL-MCNC: 7.8 MG/DL (ref 8.5–10.5)
CHLORIDE BLD-SCNC: 104 MMOL/L (ref 98–107)
CO2 SERPL-SCNC: 28 MMOL/L (ref 22–31)
CREAT SERPL-MCNC: 1.22 MG/DL (ref 0.6–1.1)
EOSINOPHIL # BLD AUTO: 0.3 THOU/UL (ref 0–0.4)
EOSINOPHIL NFR BLD AUTO: 5 % (ref 0–6)
ERYTHROCYTE [DISTWIDTH] IN BLOOD BY AUTOMATED COUNT: 17.7 % (ref 11–14.5)
GFR SERPL CREATININE-BSD FRML MDRD: 42 ML/MIN/1.73M2
GLUCOSE BLD-MCNC: 151 MG/DL (ref 70–125)
HCT VFR BLD AUTO: 32.9 % (ref 35–47)
HGB BLD-MCNC: 9.7 G/DL (ref 12–16)
LYMPHOCYTES # BLD AUTO: 1.1 THOU/UL (ref 0.8–4.4)
LYMPHOCYTES NFR BLD AUTO: 18 % (ref 20–40)
MCH RBC QN AUTO: 25.1 PG (ref 27–34)
MCHC RBC AUTO-ENTMCNC: 29.5 G/DL (ref 32–36)
MCV RBC AUTO: 85 FL (ref 80–100)
MONOCYTES # BLD AUTO: 0.6 THOU/UL (ref 0–0.9)
MONOCYTES NFR BLD AUTO: 11 % (ref 2–10)
NEUTROPHILS # BLD AUTO: 4 THOU/UL (ref 2–7.7)
NEUTROPHILS NFR BLD AUTO: 66 % (ref 50–70)
PLATELET # BLD AUTO: 165 THOU/UL (ref 140–440)
PMV BLD AUTO: 10.1 FL (ref 8.5–12.5)
POTASSIUM BLD-SCNC: 4.1 MMOL/L (ref 3.5–5)
RBC # BLD AUTO: 3.86 MILL/UL (ref 3.8–5.4)
SODIUM SERPL-SCNC: 139 MMOL/L (ref 136–145)
WBC: 6 THOU/UL (ref 4–11)

## 2019-10-30 LAB — HBA1C MFR BLD: 6.5 % (ref 4.2–6.1)

## 2019-11-05 ENCOUNTER — RECORDS - HEALTHEAST (OUTPATIENT)
Dept: LAB | Facility: CLINIC | Age: 84
End: 2019-11-05

## 2019-11-05 LAB
ERYTHROCYTE [DISTWIDTH] IN BLOOD BY AUTOMATED COUNT: 17.8 % (ref 11–14.5)
FERRITIN SERPL-MCNC: 19 NG/ML (ref 10–130)
HCT VFR BLD AUTO: 33.2 % (ref 35–47)
HGB BLD-MCNC: 9.7 G/DL (ref 12–16)
IRON SERPL-MCNC: 43 UG/DL (ref 42–175)
MCH RBC QN AUTO: 24.7 PG (ref 27–34)
MCHC RBC AUTO-ENTMCNC: 29.2 G/DL (ref 32–36)
MCV RBC AUTO: 85 FL (ref 80–100)
PLATELET # BLD AUTO: 155 THOU/UL (ref 140–440)
PMV BLD AUTO: 9.9 FL (ref 8.5–12.5)
RBC # BLD AUTO: 3.93 MILL/UL (ref 3.8–5.4)
RETICS # AUTO: 0.05 MILL/UL (ref 0.01–0.11)
RETICS/RBC NFR AUTO: 1.36 % (ref 0.8–2.7)
VIT B12 SERPL-MCNC: 847 PG/ML (ref 213–816)
WBC: 3.6 THOU/UL (ref 4–11)

## 2019-12-03 ENCOUNTER — RECORDS - HEALTHEAST (OUTPATIENT)
Dept: LAB | Facility: CLINIC | Age: 84
End: 2019-12-03

## 2019-12-03 LAB
ALBUMIN UR-MCNC: NEGATIVE MG/DL
APPEARANCE UR: CLEAR
BACTERIA #/AREA URNS HPF: ABNORMAL HPF
BILIRUB UR QL STRIP: NEGATIVE
COLOR UR AUTO: YELLOW
GLUCOSE UR STRIP-MCNC: NEGATIVE MG/DL
HGB UR QL STRIP: NEGATIVE
HYALINE CASTS #/AREA URNS LPF: ABNORMAL LPF
KETONES UR STRIP-MCNC: NEGATIVE MG/DL
LEUKOCYTE ESTERASE UR QL STRIP: ABNORMAL
MUCOUS THREADS #/AREA URNS LPF: ABNORMAL LPF
NITRATE UR QL: NEGATIVE
PH UR STRIP: 5.5 [PH] (ref 4.5–8)
RBC #/AREA URNS AUTO: ABNORMAL HPF
SP GR UR STRIP: 1.01 (ref 1–1.03)
SQUAMOUS #/AREA URNS AUTO: ABNORMAL LPF
UROBILINOGEN UR STRIP-ACNC: ABNORMAL
WBC #/AREA URNS AUTO: ABNORMAL HPF
WBC CLUMPS #/AREA URNS HPF: PRESENT /[HPF]

## 2019-12-04 ENCOUNTER — RECORDS - HEALTHEAST (OUTPATIENT)
Dept: LAB | Facility: CLINIC | Age: 84
End: 2019-12-04

## 2019-12-04 LAB
ERYTHROCYTE [DISTWIDTH] IN BLOOD BY AUTOMATED COUNT: 22.6 % (ref 11–14.5)
FERRITIN SERPL-MCNC: 31 NG/ML (ref 10–130)
HCT VFR BLD AUTO: 36.6 % (ref 35–47)
HGB BLD-MCNC: 10.9 G/DL (ref 12–16)
MCH RBC QN AUTO: 26.8 PG (ref 27–34)
MCHC RBC AUTO-ENTMCNC: 29.8 G/DL (ref 32–36)
MCV RBC AUTO: 90 FL (ref 80–100)
PLATELET # BLD AUTO: 170 THOU/UL (ref 140–440)
PMV BLD AUTO: 10.1 FL (ref 8.5–12.5)
RBC # BLD AUTO: 4.07 MILL/UL (ref 3.8–5.4)
WBC: 6.4 THOU/UL (ref 4–11)

## 2019-12-05 ENCOUNTER — RECORDS - HEALTHEAST (OUTPATIENT)
Dept: LAB | Facility: CLINIC | Age: 84
End: 2019-12-05

## 2019-12-05 LAB
ANION GAP SERPL CALCULATED.3IONS-SCNC: 7 MMOL/L (ref 5–18)
BACTERIA SPEC CULT: NO GROWTH
BASOPHILS # BLD AUTO: 0 THOU/UL (ref 0–0.2)
BASOPHILS NFR BLD AUTO: 1 % (ref 0–2)
BUN SERPL-MCNC: 14 MG/DL (ref 8–28)
CALCIUM SERPL-MCNC: 9 MG/DL (ref 8.5–10.5)
CHLORIDE BLD-SCNC: 103 MMOL/L (ref 98–107)
CO2 SERPL-SCNC: 27 MMOL/L (ref 22–31)
CREAT SERPL-MCNC: 1.33 MG/DL (ref 0.6–1.1)
EOSINOPHIL # BLD AUTO: 0.3 THOU/UL (ref 0–0.4)
EOSINOPHIL NFR BLD AUTO: 5 % (ref 0–6)
ERYTHROCYTE [DISTWIDTH] IN BLOOD BY AUTOMATED COUNT: 22.6 % (ref 11–14.5)
GFR SERPL CREATININE-BSD FRML MDRD: 38 ML/MIN/1.73M2
GLUCOSE BLD-MCNC: 163 MG/DL (ref 70–125)
HCT VFR BLD AUTO: 36.5 % (ref 35–47)
HGB BLD-MCNC: 10.9 G/DL (ref 12–16)
LYMPHOCYTES # BLD AUTO: 1.3 THOU/UL (ref 0.8–4.4)
LYMPHOCYTES NFR BLD AUTO: 24 % (ref 20–40)
MCH RBC QN AUTO: 26.7 PG (ref 27–34)
MCHC RBC AUTO-ENTMCNC: 29.9 G/DL (ref 32–36)
MCV RBC AUTO: 90 FL (ref 80–100)
MONOCYTES # BLD AUTO: 0.8 THOU/UL (ref 0–0.9)
MONOCYTES NFR BLD AUTO: 14 % (ref 2–10)
NEUTROPHILS # BLD AUTO: 3.1 THOU/UL (ref 2–7.7)
NEUTROPHILS NFR BLD AUTO: 57 % (ref 50–70)
PLAT MORPH BLD: NORMAL
PLATELET # BLD AUTO: 179 THOU/UL (ref 140–440)
PMV BLD AUTO: 10.2 FL (ref 8.5–12.5)
POLYCHROMASIA BLD QL SMEAR: ABNORMAL
POTASSIUM BLD-SCNC: 3.9 MMOL/L (ref 3.5–5)
RBC # BLD AUTO: 4.08 MILL/UL (ref 3.8–5.4)
SODIUM SERPL-SCNC: 137 MMOL/L (ref 136–145)
WBC: 5.4 THOU/UL (ref 4–11)

## 2019-12-17 ENCOUNTER — RECORDS - HEALTHEAST (OUTPATIENT)
Dept: LAB | Facility: CLINIC | Age: 84
End: 2019-12-17

## 2019-12-17 LAB
ANION GAP SERPL CALCULATED.3IONS-SCNC: 5 MMOL/L (ref 5–18)
BASOPHILS # BLD AUTO: 0 THOU/UL (ref 0–0.2)
BASOPHILS NFR BLD AUTO: 1 % (ref 0–2)
BUN SERPL-MCNC: 19 MG/DL (ref 8–28)
CALCIUM SERPL-MCNC: 8.1 MG/DL (ref 8.5–10.5)
CHLORIDE BLD-SCNC: 104 MMOL/L (ref 98–107)
CO2 SERPL-SCNC: 29 MMOL/L (ref 22–31)
CREAT SERPL-MCNC: 1.2 MG/DL (ref 0.6–1.1)
EOSINOPHIL # BLD AUTO: 0.3 THOU/UL (ref 0–0.4)
EOSINOPHIL NFR BLD AUTO: 5 % (ref 0–6)
ERYTHROCYTE [DISTWIDTH] IN BLOOD BY AUTOMATED COUNT: 21.3 % (ref 11–14.5)
FERRITIN SERPL-MCNC: 30 NG/ML (ref 10–130)
GFR SERPL CREATININE-BSD FRML MDRD: 43 ML/MIN/1.73M2
GLUCOSE BLD-MCNC: 150 MG/DL (ref 70–125)
HCT VFR BLD AUTO: 34.9 % (ref 35–47)
HGB BLD-MCNC: 10.6 G/DL (ref 12–16)
IRON SERPL-MCNC: 44 UG/DL (ref 42–175)
LAB AP CHARGES (HE HISTORICAL CONVERSION): NORMAL
LYMPHOCYTES # BLD AUTO: 1.8 THOU/UL (ref 0.8–4.4)
LYMPHOCYTES NFR BLD AUTO: 30 % (ref 20–40)
MCH RBC QN AUTO: 27.1 PG (ref 27–34)
MCHC RBC AUTO-ENTMCNC: 30.4 G/DL (ref 32–36)
MCV RBC AUTO: 89 FL (ref 80–100)
MONOCYTES # BLD AUTO: 0.5 THOU/UL (ref 0–0.9)
MONOCYTES NFR BLD AUTO: 9 % (ref 2–10)
NEUTROPHILS # BLD AUTO: 3.3 THOU/UL (ref 2–7.7)
NEUTROPHILS NFR BLD AUTO: 56 % (ref 50–70)
OVALOCYTES: ABNORMAL
PATH REPORT.COMMENTS IMP SPEC: NORMAL
PATH REPORT.COMMENTS IMP SPEC: NORMAL
PATH REPORT.FINAL DX SPEC: NORMAL
PATH REPORT.MICROSCOPIC SPEC OTHER STN: ABNORMAL
PATH REPORT.MICROSCOPIC SPEC OTHER STN: NORMAL
PATH REPORT.RELEVANT HX SPEC: NORMAL
PLAT MORPH BLD: NORMAL
PLATELET # BLD AUTO: 142 THOU/UL (ref 140–440)
PMV BLD AUTO: 10 FL (ref 8.5–12.5)
POLYCHROMASIA BLD QL SMEAR: ABNORMAL
POTASSIUM BLD-SCNC: 3.6 MMOL/L (ref 3.5–5)
RBC # BLD AUTO: 3.91 MILL/UL (ref 3.8–5.4)
REACTIVE LYMPHS: ABNORMAL
RETICS # AUTO: 0.06 MILL/UL (ref 0.01–0.11)
RETICS/RBC NFR AUTO: 1.41 % (ref 0.8–2.7)
SODIUM SERPL-SCNC: 138 MMOL/L (ref 136–145)
TEAR DROP: ABNORMAL
TSH SERPL DL<=0.005 MIU/L-ACNC: 4.81 UIU/ML (ref 0.3–5)
WBC: 6 THOU/UL (ref 4–11)

## 2020-02-21 ENCOUNTER — RECORDS - HEALTHEAST (OUTPATIENT)
Dept: LAB | Facility: CLINIC | Age: 85
End: 2020-02-21

## 2020-02-24 LAB
ANION GAP SERPL CALCULATED.3IONS-SCNC: 7 MMOL/L (ref 5–18)
BASOPHILS # BLD AUTO: 0 THOU/UL (ref 0–0.2)
BASOPHILS NFR BLD AUTO: 1 % (ref 0–2)
BUN SERPL-MCNC: 14 MG/DL (ref 8–28)
CALCIUM SERPL-MCNC: 9.1 MG/DL (ref 8.5–10.5)
CHLORIDE BLD-SCNC: 104 MMOL/L (ref 98–107)
CO2 SERPL-SCNC: 31 MMOL/L (ref 22–31)
CREAT SERPL-MCNC: 1.11 MG/DL (ref 0.6–1.1)
EOSINOPHIL # BLD AUTO: 0.3 THOU/UL (ref 0–0.4)
EOSINOPHIL NFR BLD AUTO: 5 % (ref 0–6)
ERYTHROCYTE [DISTWIDTH] IN BLOOD BY AUTOMATED COUNT: 17.9 % (ref 11–14.5)
GFR SERPL CREATININE-BSD FRML MDRD: 47 ML/MIN/1.73M2
GLUCOSE BLD-MCNC: 160 MG/DL (ref 70–125)
HBA1C MFR BLD: 6.4 % (ref 4.2–6.1)
HCT VFR BLD AUTO: 40.1 % (ref 35–47)
HGB BLD-MCNC: 11.7 G/DL (ref 12–16)
LYMPHOCYTES # BLD AUTO: 1.2 THOU/UL (ref 0.8–4.4)
LYMPHOCYTES NFR BLD AUTO: 19 % (ref 20–40)
MCH RBC QN AUTO: 28 PG (ref 27–34)
MCHC RBC AUTO-ENTMCNC: 29.2 G/DL (ref 32–36)
MCV RBC AUTO: 96 FL (ref 80–100)
MONOCYTES # BLD AUTO: 0.8 THOU/UL (ref 0–0.9)
MONOCYTES NFR BLD AUTO: 12 % (ref 2–10)
NEUTROPHILS # BLD AUTO: 4.1 THOU/UL (ref 2–7.7)
NEUTROPHILS NFR BLD AUTO: 64 % (ref 50–70)
PLATELET # BLD AUTO: 172 THOU/UL (ref 140–440)
PMV BLD AUTO: 9.7 FL (ref 8.5–12.5)
POTASSIUM BLD-SCNC: 4 MMOL/L (ref 3.5–5)
RBC # BLD AUTO: 4.18 MILL/UL (ref 3.8–5.4)
SODIUM SERPL-SCNC: 142 MMOL/L (ref 136–145)
WBC: 6.4 THOU/UL (ref 4–11)

## 2020-06-08 ENCOUNTER — RECORDS - HEALTHEAST (OUTPATIENT)
Dept: LAB | Facility: CLINIC | Age: 85
End: 2020-06-08

## 2020-06-08 LAB
ALBUMIN SERPL-MCNC: 2.9 G/DL (ref 3.5–5)
ALP SERPL-CCNC: 226 U/L (ref 45–120)
ALT SERPL W P-5'-P-CCNC: 17 U/L (ref 0–45)
ANION GAP SERPL CALCULATED.3IONS-SCNC: 7 MMOL/L (ref 5–18)
AST SERPL W P-5'-P-CCNC: 28 U/L (ref 0–40)
BASOPHILS # BLD AUTO: 0 THOU/UL (ref 0–0.2)
BASOPHILS NFR BLD AUTO: 1 % (ref 0–2)
BILIRUB SERPL-MCNC: 0.3 MG/DL (ref 0–1)
BUN SERPL-MCNC: 27 MG/DL (ref 8–28)
CALCIUM SERPL-MCNC: 8.4 MG/DL (ref 8.5–10.5)
CHLORIDE BLD-SCNC: 104 MMOL/L (ref 98–107)
CHOLEST SERPL-MCNC: 112 MG/DL
CO2 SERPL-SCNC: 28 MMOL/L (ref 22–31)
CREAT SERPL-MCNC: 1.26 MG/DL (ref 0.6–1.1)
EOSINOPHIL # BLD AUTO: 0.3 THOU/UL (ref 0–0.4)
EOSINOPHIL NFR BLD AUTO: 6 % (ref 0–6)
ERYTHROCYTE [DISTWIDTH] IN BLOOD BY AUTOMATED COUNT: 15.9 % (ref 11–14.5)
FASTING STATUS PATIENT QL REPORTED: ABNORMAL
GFR SERPL CREATININE-BSD FRML MDRD: 40 ML/MIN/1.73M2
GLUCOSE BLD-MCNC: 163 MG/DL (ref 70–125)
HBA1C MFR BLD: 6.2 %
HCT VFR BLD AUTO: 33.7 % (ref 35–47)
HDLC SERPL-MCNC: 43 MG/DL
HGB BLD-MCNC: 10.3 G/DL (ref 12–16)
LDLC SERPL CALC-MCNC: 51 MG/DL
LYMPHOCYTES # BLD AUTO: 1.1 THOU/UL (ref 0.8–4.4)
LYMPHOCYTES NFR BLD AUTO: 26 % (ref 20–40)
MCH RBC QN AUTO: 29.2 PG (ref 27–34)
MCHC RBC AUTO-ENTMCNC: 30.6 G/DL (ref 32–36)
MCV RBC AUTO: 96 FL (ref 80–100)
MONOCYTES # BLD AUTO: 0.6 THOU/UL (ref 0–0.9)
MONOCYTES NFR BLD AUTO: 14 % (ref 2–10)
NEUTROPHILS # BLD AUTO: 2.2 THOU/UL (ref 2–7.7)
NEUTROPHILS NFR BLD AUTO: 53 % (ref 50–70)
PLATELET # BLD AUTO: 127 THOU/UL (ref 140–440)
PMV BLD AUTO: 9.8 FL (ref 8.5–12.5)
POTASSIUM BLD-SCNC: 4 MMOL/L (ref 3.5–5)
PROT SERPL-MCNC: 5.9 G/DL (ref 6–8)
RBC # BLD AUTO: 3.53 MILL/UL (ref 3.8–5.4)
SODIUM SERPL-SCNC: 139 MMOL/L (ref 136–145)
TRIGL SERPL-MCNC: 91 MG/DL
TSH SERPL DL<=0.005 MIU/L-ACNC: 4.01 UIU/ML (ref 0.3–5)
WBC: 4.2 THOU/UL (ref 4–11)

## 2020-06-09 LAB — 25(OH)D3 SERPL-MCNC: 51.1 NG/ML (ref 30–80)

## 2020-08-03 ENCOUNTER — RECORDS - HEALTHEAST (OUTPATIENT)
Dept: LAB | Facility: CLINIC | Age: 85
End: 2020-08-03

## 2020-08-04 LAB
ANION GAP SERPL CALCULATED.3IONS-SCNC: 9 MMOL/L (ref 5–18)
BUN SERPL-MCNC: 21 MG/DL (ref 8–28)
CALCIUM SERPL-MCNC: 9 MG/DL (ref 8.5–10.5)
CHLORIDE BLD-SCNC: 103 MMOL/L (ref 98–107)
CO2 SERPL-SCNC: 26 MMOL/L (ref 22–31)
CREAT SERPL-MCNC: 1.3 MG/DL (ref 0.6–1.1)
ERYTHROCYTE [DISTWIDTH] IN BLOOD BY AUTOMATED COUNT: 15.1 % (ref 11–14.5)
GFR SERPL CREATININE-BSD FRML MDRD: 39 ML/MIN/1.73M2
GLUCOSE BLD-MCNC: 116 MG/DL (ref 70–125)
HCT VFR BLD AUTO: 36.1 % (ref 35–47)
HGB BLD-MCNC: 10.9 G/DL (ref 12–16)
MCH RBC QN AUTO: 29.2 PG (ref 27–34)
MCHC RBC AUTO-ENTMCNC: 30.2 G/DL (ref 32–36)
MCV RBC AUTO: 97 FL (ref 80–100)
PLATELET # BLD AUTO: 124 THOU/UL (ref 140–440)
PMV BLD AUTO: 10.7 FL (ref 8.5–12.5)
POTASSIUM BLD-SCNC: 4.6 MMOL/L (ref 3.5–5)
RBC # BLD AUTO: 3.73 MILL/UL (ref 3.8–5.4)
SODIUM SERPL-SCNC: 138 MMOL/L (ref 136–145)
WBC: 5.8 THOU/UL (ref 4–11)

## 2020-08-28 ENCOUNTER — RECORDS - HEALTHEAST (OUTPATIENT)
Dept: LAB | Facility: CLINIC | Age: 85
End: 2020-08-28

## 2020-08-31 LAB
ALBUMIN SERPL-MCNC: 3.4 G/DL (ref 3.5–5)
ALP SERPL-CCNC: 168 U/L (ref 45–120)
ALT SERPL W P-5'-P-CCNC: 11 U/L (ref 0–45)
ANION GAP SERPL CALCULATED.3IONS-SCNC: 7 MMOL/L (ref 5–18)
AST SERPL W P-5'-P-CCNC: 24 U/L (ref 0–40)
BASOPHILS # BLD AUTO: 0 THOU/UL (ref 0–0.2)
BASOPHILS NFR BLD AUTO: 1 % (ref 0–2)
BILIRUB SERPL-MCNC: 0.3 MG/DL (ref 0–1)
BUN SERPL-MCNC: 19 MG/DL (ref 8–28)
CALCIUM SERPL-MCNC: 9 MG/DL (ref 8.5–10.5)
CHLORIDE BLD-SCNC: 103 MMOL/L (ref 98–107)
CO2 SERPL-SCNC: 30 MMOL/L (ref 22–31)
CREAT SERPL-MCNC: 1.13 MG/DL (ref 0.6–1.1)
EOSINOPHIL # BLD AUTO: 0.3 THOU/UL (ref 0–0.4)
EOSINOPHIL NFR BLD AUTO: 6 % (ref 0–6)
ERYTHROCYTE [DISTWIDTH] IN BLOOD BY AUTOMATED COUNT: 14.7 % (ref 11–14.5)
FERRITIN SERPL-MCNC: 13 NG/ML (ref 10–130)
GFR SERPL CREATININE-BSD FRML MDRD: 46 ML/MIN/1.73M2
GLUCOSE BLD-MCNC: 82 MG/DL (ref 70–125)
HCT VFR BLD AUTO: 36.8 % (ref 35–47)
HGB BLD-MCNC: 11.5 G/DL (ref 12–16)
IMM GRANULOCYTES # BLD: 0 THOU/UL
IMM GRANULOCYTES NFR BLD: 0 %
LYMPHOCYTES # BLD AUTO: 1.6 THOU/UL (ref 0.8–4.4)
LYMPHOCYTES NFR BLD AUTO: 28 % (ref 20–40)
MCH RBC QN AUTO: 29.9 PG (ref 27–34)
MCHC RBC AUTO-ENTMCNC: 31.3 G/DL (ref 32–36)
MCV RBC AUTO: 96 FL (ref 80–100)
MONOCYTES # BLD AUTO: 0.8 THOU/UL (ref 0–0.9)
MONOCYTES NFR BLD AUTO: 13 % (ref 2–10)
NEUTROPHILS # BLD AUTO: 3 THOU/UL (ref 2–7.7)
NEUTROPHILS NFR BLD AUTO: 52 % (ref 50–70)
PLATELET # BLD AUTO: 135 THOU/UL (ref 140–440)
PMV BLD AUTO: 10.3 FL (ref 8.5–12.5)
POTASSIUM BLD-SCNC: 4.5 MMOL/L (ref 3.5–5)
PREALB SERPL-MCNC: 14.5 MG/DL (ref 19–38)
PROT SERPL-MCNC: 6.8 G/DL (ref 6–8)
RBC # BLD AUTO: 3.85 MILL/UL (ref 3.8–5.4)
SODIUM SERPL-SCNC: 140 MMOL/L (ref 136–145)
TSH SERPL DL<=0.005 MIU/L-ACNC: 9.45 UIU/ML (ref 0.3–5)
VIT B12 SERPL-MCNC: 683 PG/ML (ref 213–816)
WBC: 5.8 THOU/UL (ref 4–11)

## 2020-09-06 ENCOUNTER — RECORDS - HEALTHEAST (OUTPATIENT)
Dept: LAB | Facility: CLINIC | Age: 85
End: 2020-09-06

## 2020-09-08 LAB
CALCIUM, IONIZED MEASURED: 1.15 MMOL/L (ref 1.11–1.3)
ION CA PH 7.4: 1.13 MMOL/L (ref 1.11–1.3)
PH: 7.37 (ref 7.35–7.45)
PHOSPHATE SERPL-MCNC: 3.6 MG/DL (ref 2.5–4.5)
PTH-INTACT SERPL-MCNC: 75 PG/ML (ref 10–86)

## 2020-09-09 LAB
ALBUMIN PERCENT: 58.6 % (ref 51–67)
ALBUMIN SERPL ELPH-MCNC: 3.8 G/DL (ref 3.2–4.7)
ALPHA 1 PERCENT: 3.5 % (ref 2–4)
ALPHA 2 PERCENT: 10.4 % (ref 5–13)
ALPHA1 GLOB SERPL ELPH-MCNC: 0.2 G/DL (ref 0.1–0.3)
ALPHA2 GLOB SERPL ELPH-MCNC: 0.7 G/DL (ref 0.4–0.9)
B-GLOBULIN SERPL ELPH-MCNC: 0.8 G/DL (ref 0.7–1.2)
BETA PERCENT: 11.8 % (ref 10–17)
GAMMA GLOB SERPL ELPH-MCNC: 1 G/DL (ref 0.6–1.4)
GAMMA GLOBULIN PERCENT: 15.7 % (ref 9–20)
PATH ICD:: NORMAL
PROT PATTERN SERPL ELPH-IMP: NORMAL
PROT SERPL-MCNC: 6.4 G/DL (ref 6–8)
REVIEWING PATHOLOGIST: NORMAL

## 2020-09-10 LAB
ALP BONE SERPL-CCNC: 56 U/L (ref 0–55)
ALP LIVER SERPL-CCNC: 150 U/L (ref 0–94)
ALP OTHER SERPL-CCNC: 0 U/L
ALP SERPL-CCNC: 206 U/L (ref 40–120)

## 2020-09-19 ENCOUNTER — RECORDS - HEALTHEAST (OUTPATIENT)
Dept: LAB | Facility: CLINIC | Age: 85
End: 2020-09-19

## 2020-09-24 ENCOUNTER — RECORDS - HEALTHEAST (OUTPATIENT)
Dept: LAB | Facility: CLINIC | Age: 85
End: 2020-09-24

## 2020-09-25 LAB
ALBUMIN SERPL-MCNC: 2.8 G/DL (ref 3.5–5)
ALP SERPL-CCNC: 214 U/L (ref 45–120)
ALT SERPL W P-5'-P-CCNC: 10 U/L (ref 0–45)
AMYLASE SERPL-CCNC: 43 U/L (ref 5–120)
ANION GAP SERPL CALCULATED.3IONS-SCNC: 8 MMOL/L (ref 5–18)
AST SERPL W P-5'-P-CCNC: 20 U/L (ref 0–40)
BILIRUB DIRECT SERPL-MCNC: 0.1 MG/DL
BILIRUB SERPL-MCNC: 0.3 MG/DL (ref 0–1)
BUN SERPL-MCNC: 24 MG/DL (ref 8–28)
CALCIUM SERPL-MCNC: 8.7 MG/DL (ref 8.5–10.5)
CHLORIDE BLD-SCNC: 102 MMOL/L (ref 98–107)
CO2 SERPL-SCNC: 29 MMOL/L (ref 22–31)
CREAT SERPL-MCNC: 1.29 MG/DL (ref 0.6–1.1)
ERYTHROCYTE [DISTWIDTH] IN BLOOD BY AUTOMATED COUNT: 15.7 % (ref 11–14.5)
GFR SERPL CREATININE-BSD FRML MDRD: 39 ML/MIN/1.73M2
GLUCOSE BLD-MCNC: 98 MG/DL (ref 70–125)
HCT VFR BLD AUTO: 31.3 % (ref 35–47)
HGB BLD-MCNC: 9.9 G/DL (ref 12–16)
MCH RBC QN AUTO: 29.9 PG (ref 27–34)
MCHC RBC AUTO-ENTMCNC: 31.6 G/DL (ref 32–36)
MCV RBC AUTO: 95 FL (ref 80–100)
PLATELET # BLD AUTO: 128 THOU/UL (ref 140–440)
PMV BLD AUTO: 9.6 FL (ref 8.5–12.5)
POTASSIUM BLD-SCNC: 4.2 MMOL/L (ref 3.5–5)
PROT SERPL-MCNC: 5.6 G/DL (ref 6–8)
RBC # BLD AUTO: 3.31 MILL/UL (ref 3.8–5.4)
SODIUM SERPL-SCNC: 139 MMOL/L (ref 136–145)
WBC: 4.5 THOU/UL (ref 4–11)

## 2020-10-16 ENCOUNTER — RECORDS - HEALTHEAST (OUTPATIENT)
Dept: LAB | Facility: CLINIC | Age: 85
End: 2020-10-16

## 2020-10-19 LAB
ANION GAP SERPL CALCULATED.3IONS-SCNC: 11 MMOL/L (ref 5–18)
BUN SERPL-MCNC: 22 MG/DL (ref 8–28)
CALCIUM SERPL-MCNC: 9 MG/DL (ref 8.5–10.5)
CHLORIDE BLD-SCNC: 101 MMOL/L (ref 98–107)
CO2 SERPL-SCNC: 26 MMOL/L (ref 22–31)
CREAT SERPL-MCNC: 1.14 MG/DL (ref 0.6–1.1)
ERYTHROCYTE [DISTWIDTH] IN BLOOD BY AUTOMATED COUNT: 16.8 % (ref 11–14.5)
GFR SERPL CREATININE-BSD FRML MDRD: 45 ML/MIN/1.73M2
GLUCOSE BLD-MCNC: 121 MG/DL (ref 70–125)
HCT VFR BLD AUTO: 39.3 % (ref 35–47)
HGB BLD-MCNC: 12.4 G/DL (ref 12–16)
MCH RBC QN AUTO: 29.3 PG (ref 27–34)
MCHC RBC AUTO-ENTMCNC: 31.6 G/DL (ref 32–36)
MCV RBC AUTO: 93 FL (ref 80–100)
PLATELET # BLD AUTO: 189 THOU/UL (ref 140–440)
PMV BLD AUTO: 10.9 FL (ref 8.5–12.5)
POTASSIUM BLD-SCNC: 4.2 MMOL/L (ref 3.5–5)
RBC # BLD AUTO: 4.23 MILL/UL (ref 3.8–5.4)
SODIUM SERPL-SCNC: 138 MMOL/L (ref 136–145)
TSH SERPL DL<=0.005 MIU/L-ACNC: 8.77 UIU/ML (ref 0.3–5)
WBC: 8.9 THOU/UL (ref 4–11)

## 2020-11-29 ENCOUNTER — RECORDS - HEALTHEAST (OUTPATIENT)
Dept: LAB | Facility: CLINIC | Age: 85
End: 2020-11-29

## 2020-12-01 LAB — TSH SERPL DL<=0.005 MIU/L-ACNC: 4.95 UIU/ML (ref 0.3–5)

## 2021-05-24 ENCOUNTER — RECORDS - HEALTHEAST (OUTPATIENT)
Dept: ADMINISTRATIVE | Facility: CLINIC | Age: 86
End: 2021-05-24

## 2021-05-25 ENCOUNTER — RECORDS - HEALTHEAST (OUTPATIENT)
Dept: ADMINISTRATIVE | Facility: CLINIC | Age: 86
End: 2021-05-25

## 2021-05-26 ENCOUNTER — RECORDS - HEALTHEAST (OUTPATIENT)
Dept: ADMINISTRATIVE | Facility: CLINIC | Age: 86
End: 2021-05-26

## 2021-05-27 ENCOUNTER — RECORDS - HEALTHEAST (OUTPATIENT)
Dept: ADMINISTRATIVE | Facility: CLINIC | Age: 86
End: 2021-05-27

## 2021-05-28 ENCOUNTER — RECORDS - HEALTHEAST (OUTPATIENT)
Dept: ADMINISTRATIVE | Facility: CLINIC | Age: 86
End: 2021-05-28

## 2021-05-29 ENCOUNTER — RECORDS - HEALTHEAST (OUTPATIENT)
Dept: ADMINISTRATIVE | Facility: CLINIC | Age: 86
End: 2021-05-29

## 2021-05-30 ENCOUNTER — RECORDS - HEALTHEAST (OUTPATIENT)
Dept: ADMINISTRATIVE | Facility: CLINIC | Age: 86
End: 2021-05-30

## 2021-06-02 ENCOUNTER — RECORDS - HEALTHEAST (OUTPATIENT)
Dept: ADMINISTRATIVE | Facility: CLINIC | Age: 86
End: 2021-06-02

## 2021-06-02 VITALS — WEIGHT: 136 LBS | HEIGHT: 62 IN | BODY MASS INDEX: 25.03 KG/M2

## 2021-06-02 VITALS — WEIGHT: 136 LBS | BODY MASS INDEX: 24.87 KG/M2

## 2021-06-02 VITALS — BODY MASS INDEX: 25.06 KG/M2 | WEIGHT: 137 LBS

## 2021-06-02 VITALS — WEIGHT: 143 LBS | BODY MASS INDEX: 26.16 KG/M2

## 2021-06-02 VITALS — WEIGHT: 139 LBS | BODY MASS INDEX: 25.42 KG/M2

## 2021-06-02 VITALS — HEIGHT: 62 IN | BODY MASS INDEX: 25.03 KG/M2 | WEIGHT: 136 LBS

## 2021-06-15 PROBLEM — I10 HYPERTENSION: Status: ACTIVE | Noted: 2017-02-22

## 2021-06-15 PROBLEM — D50.9 IRON DEFICIENCY ANEMIA: Status: ACTIVE | Noted: 2017-01-11

## 2021-06-15 PROBLEM — E11.9 DM (DIABETES MELLITUS) (H): Status: ACTIVE | Noted: 2017-02-21

## 2021-06-15 PROBLEM — E44.0 MODERATE MALNUTRITION (H): Status: ACTIVE | Noted: 2017-02-23

## 2021-06-16 PROBLEM — N18.2 CHRONIC KIDNEY DISEASE, STAGE II (MILD): Status: ACTIVE | Noted: 2017-03-22

## 2021-06-16 PROBLEM — S22.000A CLOSED COMPRESSION FRACTURE OF THORACIC VERTEBRA, INITIAL ENCOUNTER (H): Status: ACTIVE | Noted: 2018-09-23

## 2021-06-16 PROBLEM — J44.9 COPD (CHRONIC OBSTRUCTIVE PULMONARY DISEASE) (H): Status: ACTIVE | Noted: 2018-09-30

## 2021-06-16 PROBLEM — R53.81 PHYSICAL DECONDITIONING: Status: ACTIVE | Noted: 2018-09-30
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
## 2021-06-16 PROBLEM — S42.209A FRACTURE OF PROXIMAL HUMERUS: Status: ACTIVE | Noted: 2018-09-23

## 2021-06-16 PROBLEM — E87.6 HYPOKALEMIA: Status: ACTIVE | Noted: 2018-09-30

## 2021-06-16 PROBLEM — K58.9 IRRITABLE BOWEL SYNDROME: Status: ACTIVE | Noted: 2018-09-30

## 2021-06-16 PROBLEM — T14.8XXA EXCORIATION: Status: ACTIVE | Noted: 2018-10-11

## 2021-06-20 NOTE — PROGRESS NOTES
[facility]    DATE: 2018    NAME:  Marcela Presley             :  7/10/1934  MRN: [id number]  CODE STATUS:  FULL CODE    VISIT TYPE: Problem Visit (hospital f/u)     FACILITY:  [facility] [[id number]]       CHIEF COMPLAIN/REASON FOR VISIT:    Chief Complaint   Patient presents with     Problem Visit     hospital f/u               HISTORY OF PRESENT ILLNESS: Marcela Presley is a 84 y.o. female who was admitted - for fall in bathroom at assisted living facility. She was found to have right sided surgical neck fracture with mild inferior subluxation of right humeral head without dislocation. She was also found to have acute T6 and chronic T9, T11, T12 compression fractures with T3-10 neural foraminal stenosis. Her pain was controlled on fentanyl patch with percocet. She was started on calcitonin for osteoporosis. She was discharged to TCU for further rehab. She has PMH of depression, Type 2 DM, A fib on warfarin, COPD, vitamin d deficiency, CAD, diabetic neuropathy. Prior to this she lived at [facility].     Today Ms. Presley states her first night here was very rough but last night was much better. She slept better and her pain was better controlled. Her pain is mostly in her neck down to elbow and in her back. She says the lidocaine patches do help some. She says she has chronic bowel issues due to irritable bowel syndrome. She thinks her bowels are moving ok right now and she takes asacol as needed at home for her IBS. She says she had diarrhea 3 weeks ago then developed a blockage in the hospital. She says yesterday she had a BM that was pretty formed. She says she has been having some muscle spasms in her neck and back, like a tightening feeling and then it will release. She says she does not want any muscle relaxer for this because she is worried they may make her confused. She does have some dizziness at times, even had a little last  night. She says she sweats at night but no recent fevers or chills. She says she wore oxygen in the hospital and has some shortness of breath at baseline. She denies any cough recently. She feels her shortness of breath is at baseline now. She says she has no trouble urinating now but when she was in the hospital she would be up every hour to go to the bathroom because of the IV fluids. She says now this is cleared up but she does have some chronic urgency and incontinence at times. 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 She lives there now. She says her pain is controlled on the current pain regimen. She would like to keep her aquaphor ointment at bedside. She does not feel she is having any itching or need her hydrocortisone cream at this time. She says she is forgetful and having some issues with her memory at times. She is wearing her back brace when she is out of bed. She denies any other concerns today.      REVIEW OF SYSTEMS:  PROBLEMS AND REVIEW OF SYSTEMS:   Review of Systems  Today on ROS:   Currently, no fever, chills, or rigors. Decreased vision and hearing. Denies any chest pain, headaches, palpitations, lightheadedness. Appetite is good. Denies any GERD symptoms. Denies any difficulty with swallowing, nausea, or vomiting. No active bleeding. No rash. Positive for dizziness intermittently, night sweats, shortness of breath at baseline, no cough, urinary urgency, urinary incontinence, loose stools intermittently for chronic IBS, back brace when out of bed, forgetful at times, pain in neck, elbow, back      Allergies   Allergen Reactions     Trazodone Anxiety     Brimonidine Unknown     From facility     Cefuroxime Unknown     From facility     Cefuroxime Axetil      Estradiol Unknown     From facility     Latanoprost Unknown     From facility     Lisinopril Unknown     From facility     Oxycodone Unknown     From facility      Ranitidine Unknown     FROM FACILITY     Current Outpatient Prescriptions   Medication Sig     amLODIPine (NORVASC) 5 MG tablet Take 10 mg by mouth daily.      aspirin 81 MG EC tablet Take 1 tablet (81 mg total) by mouth daily.     atorvastatin (LIPITOR) 40 MG tablet Take 40 mg by mouth at bedtime.     calcitonin, salmon, (MIACALCIN) 200 unit/actuation nasal spray Apply 1 spray into alternating nostrils daily.     cholecalciferol, vitamin D3, 1,000 unit tablet Take 1,000 Units by mouth daily.     erythromycin ophthalmic ointment Administer 1 application into the left eye at bedtime.     fentaNYL (DURAGESIC) 25 mcg/hr Place 1 patch on the skin every third day.     FLUoxetine (PROZAC) 20 MG capsule Take 60 mg by mouth daily.     fluticasone (FLONASE) 50 mcg/actuation nasal spray Apply 2 sprays into each nostril daily.     fluticasone (FLOVENT HFA) 110 mcg/actuation inhaler Inhale 1 puff 2 (two) times a day.     furosemide (LASIX) 40 MG tablet Take 40 mg by mouth 2 (two) times a day.     gabapentin (NEURONTIN) 300 MG capsule Take 300 mg by mouth 2 (two) times a day.      gabapentin (NEURONTIN) 600 MG tablet Take 600 mg by mouth daily.     HYDROcodone-acetaminophen 5-325 mg per tablet Take 2 tablets by mouth every morning.     HYDROcodone-acetaminophen 5-325 mg per tablet Take 1 tablet by mouth 3 (three) times a day. Takes at noon, 1700, and 2100     food supplemt, lactose-reduced (ENSURE ORIGINAL) 0.04-1.05 gram-kcal/mL Liqd Take 1 Can by mouth 2 (two) times a day.      lidocaine (LIDODERM) 5 % Place 1 patch on the skin daily. Remove & Discard patch within 12 hours or as directed by MD     losartan (COZAAR) 25 MG tablet Take 25 mg by mouth daily.     MESALAMINE (ASACOL HD ORAL) Take 800 mg by mouth daily as needed.      metFORMIN (GLUCOPHAGE) 500 MG tablet Take 500 mg by mouth daily with breakfast.      methyl salicylate-menthol (ICY HOT) 30-10 % Crea Apply 1 application topically daily.     metoprolol tartrate  (LOPRESSOR) 50 MG tablet Take 50 mg by mouth daily.     MIRTAZAPINE ORAL Take 37.5 mg by mouth at bedtime.     mupirocin (BACTROBAN) 2 % ointment Apply 1 application topically 2 (two) times a day as needed.     nitroglycerin (NITROSTAT) 0.4 MG SL tablet Place 0.4 mg under the tongue every 5 (five) minutes as needed (x 3 doses for chest pain).      pantoprazole (PROTONIX) 40 MG tablet Take 40 mg by mouth daily.      potassium chloride (KLOR-CON) 20 mEq packet Take 20 mEq by mouth daily.     senna-docusate (SENNOSIDES-DOCUSATE SODIUM) 8.6-50 mg tablet Take 1 tablet by mouth daily as needed for constipation.     triamcinolone (KENALOG) 0.1 % cream Apply 1 application topically 2 (two) times a day as needed.     white petrolatum (AQUAPHOR ORIGINAL) 41 % Oint Apply 1 application topically 2 (two) times a day.     Past Medical History:    Past Medical History:   Diagnosis Date     Advanced care planning/counseling discussion     DNR and DNI     Chronic anticoagulation      Chronic systolic heart failure (H)      Coronary Artery Disease     Created by Conversion      Depression      Dyslipidemia, goal LDL below 70      Esophageal dysmotility      Essential hypertension      GERD (gastroesophageal reflux disease)      Glaucoma      History of DVT (deep vein thrombosis)      History of epistaxis      Microscopic colitis     on Asacol     Prior Myocardial Infarction      Retinal artery occlusion      Rheumatoid arthritis (H)      Type 2 Diabetes Mellitus     Created by Conversion            PHYSICAL EXAMINATION  Vitals:    09/27/18 2231   BP: (!) 152/125   Pulse: 76   Resp: 18   Temp: 97.2  F (36.2  C)   SpO2: 97%   Weight: 143 lb (64.9 kg)       Today on physical exam:     GENERAL: Awake, Alert, oriented x3, not in any form of acute distress, answers questions appropriately, follows simple commands, conversant  HEENT: Head is normocephalic with normal hair distribution. No evidence of trauma. Ears: No acute purulent  discharge. Eyes: Conjunctivae pink with no scleral jaundice. Nose: Normal mucosa and septum. NECK: Supple with no cervical or supraclavicular lymphadenopathy. Trachea is midline. Decreased vision Left eye  CHEST: No tenderness or deformity, no crepitus  LUNG: Dim to auscultation with good chest expansion. There are no crackles or wheezes, normal AP diameter. shortness of breath at baseline  BACK: No kyphosis of the thoracic spine. Tenderness over thoracic spine, back brace when out of bed  CVS: irregularly irregular rhythm, there are no murmurs, rubs, gallops, or heaves,  2+ pulses symmetric in all extremities.  ABDOMEN: Rounded and soft, nontender to palpation, non distended, no masses, no organomegaly, good bowel sounds, no rebound or guarding, no peritoneal signs.   EXTREMITIES: trace nonpitting ble, rue sling in place, 1+ edema RUE, no numb/tingling RUE, ble numb/tingling  SKIN: Warm and dry, no erythema noted.  Skin color, texture, no rashes or lesions.  NEUROLOGICAL: The patient is oriented to person, place and time. Strength and sensation are grossly intact. Face is symmetric.            LABS:   Recent Results (from the past 168 hour(s))   POCT Glucose   Result Value Ref Range    Glucose,  mg/dL   POCT Glucose   Result Value Ref Range    Glucose,  mg/dL   POCT Glucose   Result Value Ref Range    Glucose,  mg/dL   POCT Glucose   Result Value Ref Range    Glucose,  mg/dL   POCT Glucose   Result Value Ref Range    Glucose,  mg/dL   POCT Glucose   Result Value Ref Range    Glucose,  mg/dL   Platelet Count - every other day x 3   Result Value Ref Range    Platelets 133 (L) 140 - 440 thou/uL   POCT Glucose   Result Value Ref Range    Glucose,  mg/dL   POCT Glucose   Result Value Ref Range    Glucose,  mg/dL   POCT Glucose   Result Value Ref Range    Glucose,  mg/dL   POCT Glucose   Result Value Ref Range    Glucose,  mg/dL   POCT Glucose   Result  Value Ref Range    Glucose,  mg/dL   POCT Glucose   Result Value Ref Range    Glucose,  mg/dL     Results for orders placed or performed during the hospital encounter of 09/23/18   Basic Metabolic Panel   Result Value Ref Range    Sodium 140 136 - 145 mmol/L    Potassium 4.1 3.5 - 5.0 mmol/L    Chloride 105 98 - 107 mmol/L    CO2 27 22 - 31 mmol/L    Anion Gap, Calculation 8 5 - 18 mmol/L    Glucose 131 (H) 70 - 125 mg/dL    Calcium 9.0 8.5 - 10.5 mg/dL    BUN 25 8 - 28 mg/dL    Creatinine 1.28 (H) 0.60 - 1.10 mg/dL    GFR MDRD Af Amer 48 (L) >60 mL/min/1.73m2    GFR MDRD Non Af Amer 40 (L) >60 mL/min/1.73m2         Lab Results   Component Value Date    WBC 7.5 09/23/2018    HGB 11.1 (L) 09/23/2018    HCT 33.4 (L) 09/23/2018    MCV 93 09/23/2018     (L) 09/25/2018       Lab Results   Component Value Date    [id number] 399 02/27/2018     Lab Results   Component Value Date    HGBA1C 7.5 (H) 08/16/2018     Lab Results   Component Value Date    INR 1.39 (H) 02/23/2017    INR >13.50 (HH) 02/22/2017    INR >13.50 (HH) 02/21/2017     Vitamin D, Total (25-Hydroxy)   Date Value Ref Range Status   02/27/2018 43.5 30.0 - 80.0 ng/mL Final     Lab Results   Component Value Date    TSH 14.78 (H) 08/16/2018           ASSESSMENT/PLAN:    1. Fall, Right Humerual neck fracture: Pain controlled on norco, fentanyl patch, lidoderm patch, icy hot. F/u with ortho in 2 weeks. NWB RUE, sling in place.   2. Acute compression fracture T6, chronic T9, T11, T12 compression fracture: Back brace when out of bed, f/u neurosurgery in 2-3 weeks. Pain controlled on norco and fentanyl patch, lidoderm patch. Started on calcitonin for osteoporosis.   3. H/o Type 2 DM: Accuchecks controlled inpatient, HGa1c 7.5 on 8/16. On diabetic diet, metformin.   4. HTN: SBP 150s, DBP >100. Increased amlodipine to 10mg. On lasix, losartan, metoprolol.   5. Osteoporosis: On vitamin d, calcitonin. F/u with osteoporosis clinic.   6. Depression: On  fluoxetine, mirtazapine.   7. Atrial fibrillation: Rate controlled 70s on metoprolol. Previously on warfarin, looks like has been off for some time and no longer follows with cardiology.   8. Irritable bowel syndrome: controlled currently, asacol daily prn. Senekot daily prn.   9. CAD: On aspirin, atorvastatin. Nitrostat prn. No further cardiology follow up. Irritable bowel sy  10. Allergic rhinitis: On flonase daily.   11. COPD: On flovent, shortness of breath at baseline. No cough.   12. GERD: On pantoprazole.   13. Hypokalemia: On kcl.   12. Diabetic neuropathy: On gabapentin  13. Elevated tsh: Suspect hypothyroid, was 6.3 on 6/8 and 14.78 on 8/16 but no thyroid medicine on chart. Will recheck during stay and address starting medication with patient.   14. DHAVAL on CKD stage 2: Cr 1.28 on 9/23. Will recheck on 10/1.   15. Ischemic cardiomyopathy, CHF: Per records in august she told PCP did not want to f/u with cardiology any further. On lasix, metoprolol, losartan.     Bmp, hm1, vit d 10/1    Electronically signed by: Libby Antony NP    Total 45 minutes of which 55% was spent in counseling and coordination of care of the above plan    Time spent in interview and examination of patient, review of available records, and discussion with nursing staff. Continue care plan, efforts at therapy, and monitor nutritional status.

## 2021-06-20 NOTE — PROGRESS NOTES
[facility]    DATE: 10/2/2018    NAME:  Marcela Presley             :  7/10/1934  MRN: [id number]  CODE STATUS:  FULL CODE    VISIT TYPE: Problem Visit     FACILITY:  [facility] [[id number]]       CHIEF COMPLAIN/REASON FOR VISIT:    Chief Complaint   Patient presents with     Problem Visit               HISTORY OF PRESENT ILLNESS: Marcela Presley is a 84 y.o. female who was admitted - for fall in bathroom at assisted living facility. She was found to have right sided surgical neck fracture with mild inferior subluxation of right humeral head without dislocation. She was also found to have acute T6 and chronic T9, T11, T12 compression fractures with T3-10 neural foraminal stenosis. Her pain was controlled on fentanyl patch with percocet. She was started on calcitonin for osteoporosis. She was discharged to TCU for further rehab. She has PMH of depression, Type 2 DM, A fib on warfarin, COPD, vitamin d deficiency, CAD, diabetic neuropathy. Prior to this she lived at [facility].     Today Ms. Presley states she is much happier since she moved rooms. She was in a semi private and was not getting along with her roommate so they moved her to a private room now. She says she is upset because she still doesn't feel like her pain meds are being given on her schedule. She says her pain is very well controlled if the norco is given at the times she is supposed to get them at. If they are given late then her pain is out of control. She says she wants to know if her pain meds in the morning can be scheduled earlier than they are now so she is the first person to receive meds in the morning instead of the last. She says she doesn't know if the fentanyl patch is really helping her right now but wants to keep it for now and maybe try off it at the next visit. She says otherwise her bowels are moving and no issues with her appetite or stomach. She denies any  shortness of breath. She took the splint off her arm last night and just slet with the sling and says her pain was much improved this way. She says therapy is going ok. She is having a lot of her chronic pain flaring up and not just the new pain so she is hurting in her back, neck, shoulder. She didn't have any numbness or tingling in her right hand until this morning maybe just a little tingling not numbness in the last two fingers. She denies any problems with fevers or chills. Her blood sugars have been fine and she has a couple follow up appts next week. She says she had an episode of chest pain but they gave her something and it went away. She has not had it a gain.     REVIEW OF SYSTEMS:  PROBLEMS AND REVIEW OF SYSTEMS:   Review of Systems  Today on ROS:   Currently, no fever, chills, or rigors. Decreased vision and hearing. Denies any chest pain, headaches, palpitations, lightheadedness. Appetite is good. Denies any GERD symptoms. Denies any difficulty with swallowing, nausea, or vomiting. No active bleeding. No rash. Positive for shortness of breath at times, no cough, urinary urgency, urinary incontinence, loose stools intermittently for chronic IBS, back brace when out of bed, forgetful at times, pain in neck, elbow, back, shoulder, episode of chest pain few days ago relieved with nitro      Allergies   Allergen Reactions     Trazodone Anxiety     Brimonidine Unknown     From facility     Cefuroxime Unknown     From facility     Cefuroxime Axetil      Estradiol Unknown     From facility     Latanoprost Unknown     From facility     Lisinopril Unknown     From facility     Oxycodone Unknown     From facility     Ranitidine Unknown     FROM FACILITY     Current Outpatient Prescriptions   Medication Sig     amLODIPine (NORVASC) 5 MG tablet Take 10 mg by mouth daily.      aspirin 81 MG EC tablet Take 1 tablet (81 mg total) by mouth daily.     atorvastatin (LIPITOR) 40 MG tablet Take 40 mg by mouth at bedtime.      calcitonin, salmon, (MIACALCIN) 200 unit/actuation nasal spray Apply 1 spray into alternating nostrils daily.     cholecalciferol, vitamin D3, 1,000 unit tablet Take 1,000 Units by mouth daily.     erythromycin ophthalmic ointment Administer 1 application into the left eye at bedtime.     fentaNYL (DURAGESIC) 25 mcg/hr Place 1 patch on the skin every third day.     FLUoxetine (PROZAC) 20 MG capsule Take 60 mg by mouth daily.     fluticasone (FLONASE) 50 mcg/actuation nasal spray Apply 2 sprays into each nostril daily.     fluticasone (FLOVENT HFA) 110 mcg/actuation inhaler Inhale 1 puff 2 (two) times a day.     food supplemt, lactose-reduced (ENSURE ORIGINAL) 0.04-1.05 gram-kcal/mL Liqd Take 1 Can by mouth 2 (two) times a day.      furosemide (LASIX) 40 MG tablet Take 40 mg by mouth 2 (two) times a day.     gabapentin (NEURONTIN) 300 MG capsule Take 300 mg by mouth 2 (two) times a day.      gabapentin (NEURONTIN) 600 MG tablet Take 600 mg by mouth daily.     HYDROcodone-acetaminophen 5-325 mg per tablet Take 2 tablets by mouth every morning.     HYDROcodone-acetaminophen 5-325 mg per tablet Take 1 tablet by mouth 3 (three) times a day. Takes at noon, 1700, and 2100     lidocaine (LIDODERM) 5 % Place 1 patch on the skin daily. Remove & Discard patch within 12 hours or as directed by MD     losartan (COZAAR) 25 MG tablet Take 25 mg by mouth daily.     MESALAMINE (ASACOL HD ORAL) Take 800 mg by mouth daily as needed.      metFORMIN (GLUCOPHAGE) 500 MG tablet Take 500 mg by mouth daily with breakfast.      methyl salicylate-menthol (ICY HOT) 30-10 % Crea Apply 1 application topically daily.     metoprolol tartrate (LOPRESSOR) 50 MG tablet Take 50 mg by mouth daily.     MIRTAZAPINE ORAL Take 37.5 mg by mouth at bedtime.     mupirocin (BACTROBAN) 2 % ointment Apply 1 application topically 2 (two) times a day as needed.     nitroglycerin (NITROSTAT) 0.4 MG SL tablet Place 0.4 mg under the tongue every 5 (five) minutes  as needed (x 3 doses for chest pain).      pantoprazole (PROTONIX) 40 MG tablet Take 40 mg by mouth daily.      potassium chloride (KLOR-CON) 20 mEq packet Take 20 mEq by mouth daily.     senna-docusate (SENNOSIDES-DOCUSATE SODIUM) 8.6-50 mg tablet Take 1 tablet by mouth daily as needed for constipation.     triamcinolone (KENALOG) 0.1 % cream Apply 1 application topically 2 (two) times a day as needed.     white petrolatum (AQUAPHOR ORIGINAL) 41 % Oint Apply 1 application topically 2 (two) times a day.     Past Medical History:    Past Medical History:   Diagnosis Date     Advanced care planning/counseling discussion     DNR and DNI     Chronic anticoagulation      Chronic systolic heart failure (H)      Coronary Artery Disease     Created by Conversion      Depression      Dyslipidemia, goal LDL below 70      Esophageal dysmotility      Essential hypertension      GERD (gastroesophageal reflux disease)      Glaucoma      History of DVT (deep vein thrombosis)      History of epistaxis      Microscopic colitis     on Asacol     Prior Myocardial Infarction      Retinal artery occlusion      Rheumatoid arthritis (H)      Type 2 Diabetes Mellitus     Created by Conversion            PHYSICAL EXAMINATION  Vitals:    10/01/18 2147   BP: 132/81   Pulse: 83   Resp: 18   Temp: (!) 96.4  F (35.8  C)   SpO2: 96%       Today on physical exam:     GENERAL: Awake, Alert, oriented x3, not in any form of acute distress, answers questions appropriately, follows simple commands, conversant  HEENT: Head is normocephalic with normal hair distribution. No evidence of trauma. Ears: No acute purulent discharge. Eyes: Conjunctivae pink with no scleral jaundice. Nose: Normal mucosa and septum. NECK: Supple with no cervical or supraclavicular lymphadenopathy. Trachea is midline. Decreased vision Left eye  CHEST: No tenderness or deformity, no crepitus  LUNG: Dim to auscultation with good chest expansion. There are no crackles or wheezes,  normal AP diameter.   BACK: No kyphosis of the thoracic spine. Tenderness over thoracic spine, back brace when out of bed  CVS: irregularly irregular rhythm, there are no murmurs, rubs, gallops, or heaves,  2+ pulses symmetric in all extremities.  ABDOMEN: Rounded and soft, nontender to palpation, non distended, no masses, no organomegaly, good bowel sounds, no rebound or guarding, no peritoneal signs.   EXTREMITIES: trace nonpitting ble, rue sling in place, 1+ edema RUE, slight tingling in 4th and 5th fingers right hand, ble numb/tingling  SKIN: Warm and dry, no erythema noted.  Skin color, texture, no rashes or lesions.  NEUROLOGICAL: The patient is oriented to person, place and time. Strength and sensation are grossly intact. Face is symmetric.Anxious            LABS:   Recent Results (from the past 168 hour(s))   POCT Glucose   Result Value Ref Range    Glucose,  mg/dL   POCT Glucose   Result Value Ref Range    Glucose,  mg/dL   POCT Glucose   Result Value Ref Range    Glucose,  mg/dL   POCT Glucose   Result Value Ref Range    Glucose,  mg/dL   Basic Metabolic Panel   Result Value Ref Range    Sodium 141 136 - 145 mmol/L    Potassium 4.3 3.5 - 5.0 mmol/L    Chloride 103 98 - 107 mmol/L    CO2 27 22 - 31 mmol/L    Anion Gap, Calculation 11 5 - 18 mmol/L    Glucose 124 70 - 125 mg/dL    Calcium 9.0 8.5 - 10.5 mg/dL    BUN 23 8 - 28 mg/dL    Creatinine 0.99 0.60 - 1.10 mg/dL    GFR MDRD Af Amer >60 >60 mL/min/1.73m2    GFR MDRD Non Af Amer 53 (L) >60 mL/min/1.73m2   Vitamin D, Total (25-Hydroxy)   Result Value Ref Range    Vitamin D, Total (25-Hydroxy) 32.7 30.0 - 80.0 ng/mL   HM1 (CBC with Diff)   Result Value Ref Range    WBC 7.6 4.0 - 11.0 thou/uL    RBC 3.45 (L) 3.80 - 5.40 mill/uL    Hemoglobin 10.4 (L) 12.0 - 16.0 g/dL    Hematocrit 33.2 (L) 35.0 - 47.0 %    MCV 96 80 - 100 fL    MCH 30.1 27.0 - 34.0 pg    MCHC 31.3 (L) 32.0 - 36.0 g/dL    RDW 15.6 (H) 11.0 - 14.5 %    Platelets  250 140 - 440 thou/uL    MPV 10.0 8.5 - 12.5 fL    Neutrophils % 63 50 - 70 %    Lymphocytes % 24 20 - 40 %    Monocytes % 9 2 - 10 %    Eosinophils % 4 0 - 6 %    Basophils % 0 0 - 2 %    Neutrophils Absolute 4.7 2.0 - 7.7 thou/uL    Lymphocytes Absolute 1.8 0.8 - 4.4 thou/uL    Monocytes Absolute 0.7 0.0 - 0.9 thou/uL    Eosinophils Absolute 0.3 0.0 - 0.4 thou/uL    Basophils Absolute 0.0 0.0 - 0.2 thou/uL     Results for orders placed or performed in visit on 10/01/18   Basic Metabolic Panel   Result Value Ref Range    Sodium 141 136 - 145 mmol/L    Potassium 4.3 3.5 - 5.0 mmol/L    Chloride 103 98 - 107 mmol/L    CO2 27 22 - 31 mmol/L    Anion Gap, Calculation 11 5 - 18 mmol/L    Glucose 124 70 - 125 mg/dL    Calcium 9.0 8.5 - 10.5 mg/dL    BUN 23 8 - 28 mg/dL    Creatinine 0.99 0.60 - 1.10 mg/dL    GFR MDRD Af Amer >60 >60 mL/min/1.73m2    GFR MDRD Non Af Amer 53 (L) >60 mL/min/1.73m2         Lab Results   Component Value Date    WBC 7.6 10/01/2018    HGB 10.4 (L) 10/01/2018    HCT 33.2 (L) 10/01/2018    MCV 96 10/01/2018     10/01/2018       Lab Results   Component Value Date    OFQNVFRT53 399 02/27/2018     Lab Results   Component Value Date    HGBA1C 7.5 (H) 08/16/2018     Lab Results   Component Value Date    INR 1.39 (H) 02/23/2017    INR >13.50 (HH) 02/22/2017    INR >13.50 (HH) 02/21/2017     Vitamin D, Total (25-Hydroxy)   Date Value Ref Range Status   10/01/2018 32.7 30.0 - 80.0 ng/mL Final     Lab Results   Component Value Date    TSH 14.78 (H) 08/16/2018           ASSESSMENT/PLAN:    1. Fall, Right Humerual neck fracture: Pain controlled on norco, fentanyl patch, lidoderm patch, icy hot prn. F/u with ortho 10/15. NWB RUE, sling in place. Slight tingling 4th and 5th fingers. Monitor.   2. Acute compression fracture T6, chronic T9, T11, T12 compression fracture: Back brace when out of bed, f/u neurosurgery 10/12. Pain controlled on norco and fentanyl patch, lidoderm patch.  on calcitonin for  osteoporosis. Changed am norco schedule to 0600.   3. H/o Type 2 DM: Accuchecks 120-162, HGa1c 7.5 on 8/16. On diabetic diet, metformin.   4. HTN: SBP 130s. Increased amlodipine to 10mg and DBP improved. On lasix, losartan, metoprolol.   5. Osteoporosis: On vitamin d, calcitonin. F/u with osteoporosis clinic.   6. Depression: On fluoxetine, mirtazapine.   7. Atrial fibrillation: Rate controlled 80s on metoprolol. Previously on warfarin, looks like has been off for some time and no longer follows with cardiology.   8. Irritable bowel syndrome: controlled currently, asacol daily prn. Sen[place]ot daily prn.   9. CAD: On aspirin, atorvastatin. Nitrostat prn. No further cardiology follow up. Chest pain episode few days ago relieved with nitro. No recurrence.   10. Allergic rhinitis: On flonase daily.   11. COPD: On flovent, shortness of breath at baseline. No cough.   12. GERD: On pantoprazole.   13. Hypokalemia: On kcl.   12. Diabetic neuropathy: On gabapentin  13. Elevated tsh: Suspect hypothyroid, was 6.3 on 6/8 and 14.78 on 8/16 but no thyroid medicine on chart. Will recheck 10/2.   14. DHAVAL on CKD stage 2: Cr 1.28 on 9/23. Will recheck on 10/1-0.99, stable.    15. Ischemic cardiomyopathy, CHF: Per records in august she told PCP did not want to f/u with cardiology any further. On lasix, metoprolol, losartan.   16. Anxiety: Agreeable to seeing psych in house.  17. Anemia of ckd: Hg 10.4 on 10/2. Monitor.      Per therapy soft LCD 10/15 max assist for toileting, ambulated 20 feet with hemiwalker and cga. 24 on slums.     Electronically signed by: Libby Antony NP    Total 25 minutes of which 55% was spent in counseling and coordination of care of the above plan    Time spent in interview and examination of patient, review of available records, and discussion with nursing staff. Continue care plan, efforts at therapy, and monitor nutritional status.

## 2021-06-20 NOTE — PROGRESS NOTES
[facility]    DATE: 10/9/2018    NAME:  Marcela Presley             :  7/10/1934  MRN: [id number]  CODE STATUS:  FULL CODE    VISIT TYPE: Problem Visit     FACILITY:  [facility] [place] [[id number]]       CHIEF COMPLAIN/REASON FOR VISIT:    Chief Complaint   Patient presents with     Problem Visit               HISTORY OF PRESENT ILLNESS: Marcela Presley is a 84 y.o. female who was admitted - for fall in bathroom at assisted living facility. She was found to have right sided surgical neck fracture with mild inferior subluxation of right humeral head without dislocation. She was also found to have acute T6 and chronic T9, T11, T12 compression fractures with T3-10 neural foraminal stenosis. Her pain was controlled on fentanyl patch with percocet. She was started on calcitonin for osteoporosis. She was discharged to TCU for further rehab. She has PMH of depression, Type 2 DM, A fib on warfarin, COPD, vitamin d deficiency, CAD, diabetic neuropathy. Prior to this she lived at [facility].     Today Ms. Presley states she slept very well last night and has been sleeping better the last few nights. She says her back brace doesn't bother her as much if it is put on the right way. Today the OT knew how to put her brace on the correct way so it is not bothering her so much today. She says her shortness of breath and cough is at baseline. She does feel short of breath when laying flat a little. She denies any fevers or chills and having a little bit of dizziness. She says it is just when standing or doing therapy for long period of time. She thinks  Some of her therapy is going superb but other parts are just ok. She says therapy got her a special fork to use for her left hand and now it is easier to eat. She goes back and forth between loose stools and constipation but she has this problem at home too. She thinks her pain is controlled and no other concerns at this  time. She has f/u with neurosurgery on 10/12. She sees ortho on 10/16.     REVIEW OF SYSTEMS:  PROBLEMS AND REVIEW OF SYSTEMS:   Review of Systems  Today on ROS:   Currently, no fever, chills, or rigors. Decreased vision and hearing. Denies any chest pain, headaches, palpitations. Appetite is good. Denies any GERD symptoms. Denies any difficulty with swallowing, nausea, or vomiting. No active bleeding. No rash. Positive for shortness of breath at times, no cough, urinary urgency, urinary incontinence, loose stools intermittently for chronic IBS, back brace when out of bed, forgetful at times, dizziness at times, back pain      Allergies   Allergen Reactions     Trazodone Anxiety     Brimonidine Unknown     From facility     Cefuroxime Unknown     From facility     Cefuroxime Axetil      Estradiol Unknown     From facility     Latanoprost Unknown     From facility     Lisinopril Unknown     From facility     Oxycodone Unknown     From facility     Ranitidine Unknown     FROM FACILITY     Current Outpatient Prescriptions   Medication Sig     amLODIPine (NORVASC) 5 MG tablet Take 10 mg by mouth daily.      aspirin 81 MG EC tablet Take 1 tablet (81 mg total) by mouth daily.     atorvastatin (LIPITOR) 40 MG tablet Take 40 mg by mouth at bedtime.     calcitonin, salmon, (MIACALCIN) 200 unit/actuation nasal spray Apply 1 spray into alternating nostrils daily.     cholecalciferol, vitamin D3, 1,000 unit tablet Take 1,000 Units by mouth daily.     erythromycin ophthalmic ointment Administer 1 application into the left eye at bedtime.     fentaNYL (DURAGESIC) 25 mcg/hr Place 1 patch on the skin every third day.     FLUoxetine (PROZAC) 20 MG capsule Take 60 mg by mouth daily.     fluticasone (FLONASE) 50 mcg/actuation nasal spray Apply 2 sprays into each nostril daily.     fluticasone (FLOVENT HFA) 110 mcg/actuation inhaler Inhale 1 puff 2 (two) times a day.     food supplemt, lactose-reduced (ENSURE ORIGINAL) 0.04-1.05  gram-kcal/mL Liqd Take 1 Can by mouth 2 (two) times a day.      furosemide (LASIX) 40 MG tablet Take 40 mg by mouth 2 (two) times a day.     gabapentin (NEURONTIN) 300 MG capsule Take 300 mg by mouth 2 (two) times a day.      gabapentin (NEURONTIN) 600 MG tablet Take 600 mg by mouth daily.     HYDROcodone-acetaminophen 5-325 mg per tablet Take 2 tablets by mouth every morning.     HYDROcodone-acetaminophen 5-325 mg per tablet Take 1 tablet by mouth 3 (three) times a day. Takes at noon, 1700, and 2100     lidocaine (LIDODERM) 5 % Place 1 patch on the skin daily. Remove & Discard patch within 12 hours or as directed by MD     losartan (COZAAR) 25 MG tablet Take 25 mg by mouth daily.     MESALAMINE (ASACOL HD ORAL) Take 800 mg by mouth daily as needed.      metFORMIN (GLUCOPHAGE) 500 MG tablet Take 500 mg by mouth daily with breakfast.      methyl salicylate-menthol (ICY HOT) 30-10 % Crea Apply 1 application topically daily.     metoprolol tartrate (LOPRESSOR) 50 MG tablet Take 50 mg by mouth daily.     MIRTAZAPINE ORAL Take 37.5 mg by mouth at bedtime.     mupirocin (BACTROBAN) 2 % ointment Apply 1 application topically 2 (two) times a day as needed.     nitroglycerin (NITROSTAT) 0.4 MG SL tablet Place 0.4 mg under the tongue every 5 (five) minutes as needed (x 3 doses for chest pain).      pantoprazole (PROTONIX) 40 MG tablet Take 40 mg by mouth daily.      potassium chloride (KLOR-CON) 20 mEq packet Take 20 mEq by mouth daily.     senna-docusate (SENNOSIDES-DOCUSATE SODIUM) 8.6-50 mg tablet Take 1 tablet by mouth daily as needed for constipation.     triamcinolone (KENALOG) 0.1 % cream Apply 1 application topically 2 (two) times a day as needed.     white petrolatum (AQUAPHOR ORIGINAL) 41 % Oint Apply 1 application topically 2 (two) times a day.     Past Medical History:    Past Medical History:   Diagnosis Date     Advanced care planning/counseling discussion     DNR and DNI     Chronic anticoagulation      Chronic  systolic heart failure (H)      Coronary Artery Disease     Created by Conversion      Depression      Dyslipidemia, goal LDL below 70      Esophageal dysmotility      Essential hypertension      GERD (gastroesophageal reflux disease)      Glaucoma      History of DVT (deep vein thrombosis)      History of epistaxis      Microscopic colitis     on Asacol     Prior Myocardial Infarction      Retinal artery occlusion      Rheumatoid arthritis (H)      Type 2 Diabetes Mellitus     Created by Conversion            PHYSICAL EXAMINATION  Vitals:    10/08/18 2152   BP: 139/68   Pulse: 75   Resp: 16   Temp: 97.3  F (36.3  C)   SpO2: 90%   Weight: 137 lb (62.1 kg)       Today on physical exam:     GENERAL: Awake, Alert, oriented x3, not in any form of acute distress, answers questions appropriately, follows simple commands, conversant  HEENT: Head is normocephalic with normal hair distribution. No evidence of trauma. Ears: No acute purulent discharge. Eyes: Conjunctivae pink with no scleral jaundice. Nose: Normal mucosa and septum. NECK: Supple with no cervical or supraclavicular lymphadenopathy. Trachea is midline. Decreased vision Left eye  CHEST: No tenderness or deformity, no crepitus  LUNG: Dim to auscultation with good chest expansion. There are no crackles or wheezes, normal AP diameter.   BACK: No kyphosis of the thoracic spine. Tenderness over thoracic spine, back brace when out of bed  CVS: irregularly irregular rhythm, there are no murmurs, rubs, gallops, or heaves,  2+ pulses symmetric in all extremities.  ABDOMEN: Rounded and soft, nontender to palpation, non distended, no masses, no organomegaly, good bowel sounds, no rebound or guarding, no peritoneal signs.   EXTREMITIES: trace nonpitting ble, rue sling in place, 1+ edema RUE, slight tingling in 4th and 5th fingers right hand, ble numb/tingling  SKIN: Warm and dry, no erythema noted.  Skin color, texture, no rashes or lesions.  NEUROLOGICAL: The patient is  oriented to person, place and time. Strength and sensation are grossly intact. Face is symmetric. Anxious at times            LABS:   No results found for this or any previous visit (from the past 168 hour(s)).  Results for orders placed or performed in visit on 10/01/18   Basic Metabolic Panel   Result Value Ref Range    Sodium 141 136 - 145 mmol/L    Potassium 4.3 3.5 - 5.0 mmol/L    Chloride 103 98 - 107 mmol/L    CO2 27 22 - 31 mmol/L    Anion Gap, Calculation 11 5 - 18 mmol/L    Glucose 124 70 - 125 mg/dL    Calcium 9.0 8.5 - 10.5 mg/dL    BUN 23 8 - 28 mg/dL    Creatinine 0.99 0.60 - 1.10 mg/dL    GFR MDRD Af Amer >60 >60 mL/min/1.73m2    GFR MDRD Non Af Amer 53 (L) >60 mL/min/1.73m2         Lab Results   Component Value Date    WBC 7.6 10/01/2018    HGB 10.4 (L) 10/01/2018    HCT 33.2 (L) 10/01/2018    MCV 96 10/01/2018     10/01/2018       Lab Results   Component Value Date    [id number] 399 02/27/2018     Lab Results   Component Value Date    HGBA1C 7.5 (H) 08/16/2018     Lab Results   Component Value Date    INR 1.39 (H) 02/23/2017    INR >13.50 (HH) 02/22/2017    INR >13.50 (HH) 02/21/2017     Vitamin D, Total (25-Hydroxy)   Date Value Ref Range Status   10/01/2018 32.7 30.0 - 80.0 ng/mL Final     Lab Results   Component Value Date    TSH 2.73 10/02/2018           ASSESSMENT/PLAN:    1. Fall, Right Humerual neck fracture: Pain controlled on norco, fentanyl patch, lidoderm patch, icy hot prn. F/u with ortho 10/15. NWB RUE, sling in place. Slight tingling 4th and 5th fingers. Monitor.   2. Acute compression fracture T6, chronic T9, T11, T12 compression fracture: Back brace when out of bed, f/u neurosurgery 10/12. Pain controlled on norco and fentanyl patch, lidoderm patch.  on calcitonin for osteoporosis. Changed am norco schedule to 0600 and pain improved. Ordered to take back brace off twice daily while resting in bed.   3. H/o Type 2 DM: Accuchecks 123-170, HGa1c 7.5 on 8/16. On diabetic diet,  metformin.   4. HTN: SBP 110s. Increased amlodipine to 10mg and DBP improved. On lasix, losartan, metoprolol.   5. Osteoporosis: On vitamin d, calcitonin. F/u with osteoporosis clinic.   6. Depression: On fluoxetine, mirtazapine.   7. Atrial fibrillation: Rate controlled 70s on metoprolol. Previously on warfarin, looks like has been off for some time and no longer follows with cardiology.   8. Irritable bowel syndrome: controlled currently, asacol daily prn. Senekot daily prn. Back and forth between constipation and diarrhea.   9. CAD: On aspirin, atorvastatin. Nitrostat prn.   10. Allergic rhinitis: On flonase daily.   11. COPD: On flovent, shortness of breath at baseline. No cough.   12. GERD: On pantoprazole.   13. Hypokalemia: On kcl.   12. Diabetic neuropathy: On gabapentin  13. Elevated tsh: Suspect hypothyroid, was 6.3 on 6/8 and 14.78 on 8/16 but no thyroid medicine on chart. Will recheck 10/2-now back to normal 2.73.   14. DHAVAL on CKD stage 2: Cr 1.28 on 9/23. Will recheck on 10/1-0.99, stable.    15. Ischemic cardiomyopathy, CHF: Per records in august she told PCP did not want to f/u with cardiology any further. On lasix, metoprolol, losartan.   16. Anxiety: Agreeable to seeing psych in house.  17. Anemia of ckd: Hg 10.4 on 10/2. Monitor.      Per therapy soft LCD 10/15 max assist for toileting, ambulated 150 feet with quad cane and cga, assist of 1 for transfers. 24 on slums.     Electronically signed by: Libby Antony NP    Total 25 minutes of which 55% was spent in counseling and coordination of care of the above plan    Time spent in interview and examination of patient, review of available records, and discussion with nursing staff. Continue care plan, efforts at therapy, and monitor nutritional status.

## 2021-06-20 NOTE — PROGRESS NOTES
[facility]    DATE: 10/4/2018    NAME:  Marcela Presley             :  7/10/1934  MRN: [id number]  CODE STATUS:  FULL CODE    VISIT TYPE: Review Of Multiple Medical Conditions     FACILITY:  [facility] [[id number]]       CHIEF COMPLAIN/REASON FOR VISIT:    Chief Complaint   Patient presents with     Review Of Multiple Medical Conditions               HISTORY OF PRESENT ILLNESS: Marcela Presley is a 84 y.o. female who was admitted - for fall in bathroom at assisted living facility. She was found to have right sided surgical neck fracture with mild inferior subluxation of right humeral head without dislocation. She was also found to have acute T6 and chronic T9, T11, T12 compression fractures with T3-10 neural foraminal stenosis. Her pain was controlled on fentanyl patch with percocet. She was started on calcitonin for osteoporosis. She was discharged to TCU for further rehab. She has PMH of depression, Type 2 DM, A fib on warfarin, COPD, vitamin d deficiency, CAD, diabetic neuropathy. Prior to this she lived at [facility].     Today Ms. Presley states she had a really bad day yesterday but she slept very well last night. She only had one pain pill during the night. She says her shortness of breath is at baseline, except for when she is wearing the brace. She says the brace is terribly uncomfortable and she cannot sit in the recliner with it on. She can't wear it all day as she feels like she can't breathe and that it is causing skin breakdown. She thinks she needs a few breaks per day. She says she is private pay here and is only planning to stay two weeks because that is all she can afford. She needs to go home next week and then continue therapy at home. She says she is wearing the zaynab hose every day and does this even at home, ever since she had her DVT. She says she had a BM yesterday. She denies any other concerns at this time but wondering  if she can have an order to take her brace off 2-3 times per day and rest in order to prevent skin breakdown.     REVIEW OF SYSTEMS:  PROBLEMS AND REVIEW OF SYSTEMS:   Review of Systems  Today on ROS:   Currently, no fever, chills, or rigors. Decreased vision and hearing. Denies any chest pain, headaches, palpitations, lightheadedness. Appetite is good. Denies any GERD symptoms. Denies any difficulty with swallowing, nausea, or vomiting. No active bleeding. No rash. Positive for shortness of breath at times, no cough, urinary urgency, urinary incontinence, loose stools intermittently for chronic IBS, back brace when out of bed, forgetful at times, pain in neck, elbow, back, shoulder, no chest pain, discomfort related to back brace      Allergies   Allergen Reactions     Trazodone Anxiety     Brimonidine Unknown     From facility     Cefuroxime Unknown     From facility     Cefuroxime Axetil      Estradiol Unknown     From facility     Latanoprost Unknown     From facility     Lisinopril Unknown     From facility     Oxycodone Unknown     From facility     Ranitidine Unknown     FROM FACILITY     Current Outpatient Prescriptions   Medication Sig     amLODIPine (NORVASC) 5 MG tablet Take 10 mg by mouth daily.      aspirin 81 MG EC tablet Take 1 tablet (81 mg total) by mouth daily.     atorvastatin (LIPITOR) 40 MG tablet Take 40 mg by mouth at bedtime.     calcitonin, salmon, (MIACALCIN) 200 unit/actuation nasal spray Apply 1 spray into alternating nostrils daily.     cholecalciferol, vitamin D3, 1,000 unit tablet Take 1,000 Units by mouth daily.     erythromycin ophthalmic ointment Administer 1 application into the left eye at bedtime.     fentaNYL (DURAGESIC) 25 mcg/hr Place 1 patch on the skin every third day.     FLUoxetine (PROZAC) 20 MG capsule Take 60 mg by mouth daily.     fluticasone (FLONASE) 50 mcg/actuation nasal spray Apply 2 sprays into each nostril daily.     fluticasone (FLOVENT HFA) 110 mcg/actuation  inhaler Inhale 1 puff 2 (two) times a day.     food supplemt, lactose-reduced (ENSURE ORIGINAL) 0.04-1.05 gram-kcal/mL Liqd Take 1 Can by mouth 2 (two) times a day.      furosemide (LASIX) 40 MG tablet Take 40 mg by mouth 2 (two) times a day.     gabapentin (NEURONTIN) 300 MG capsule Take 300 mg by mouth 2 (two) times a day.      gabapentin (NEURONTIN) 600 MG tablet Take 600 mg by mouth daily.     HYDROcodone-acetaminophen 5-325 mg per tablet Take 2 tablets by mouth every morning.     HYDROcodone-acetaminophen 5-325 mg per tablet Take 1 tablet by mouth 3 (three) times a day. Takes at noon, 1700, and 2100     lidocaine (LIDODERM) 5 % Place 1 patch on the skin daily. Remove & Discard patch within 12 hours or as directed by MD     losartan (COZAAR) 25 MG tablet Take 25 mg by mouth daily.     MESALAMINE (ASACOL HD ORAL) Take 800 mg by mouth daily as needed.      metFORMIN (GLUCOPHAGE) 500 MG tablet Take 500 mg by mouth daily with breakfast.      methyl salicylate-menthol (ICY HOT) 30-10 % Crea Apply 1 application topically daily.     metoprolol tartrate (LOPRESSOR) 50 MG tablet Take 50 mg by mouth daily.     MIRTAZAPINE ORAL Take 37.5 mg by mouth at bedtime.     mupirocin (BACTROBAN) 2 % ointment Apply 1 application topically 2 (two) times a day as needed.     nitroglycerin (NITROSTAT) 0.4 MG SL tablet Place 0.4 mg under the tongue every 5 (five) minutes as needed (x 3 doses for chest pain).      pantoprazole (PROTONIX) 40 MG tablet Take 40 mg by mouth daily.      potassium chloride (KLOR-CON) 20 mEq packet Take 20 mEq by mouth daily.     senna-docusate (SENNOSIDES-DOCUSATE SODIUM) 8.6-50 mg tablet Take 1 tablet by mouth daily as needed for constipation.     triamcinolone (KENALOG) 0.1 % cream Apply 1 application topically 2 (two) times a day as needed.     white petrolatum (AQUAPHOR ORIGINAL) 41 % Oint Apply 1 application topically 2 (two) times a day.     Past Medical History:    Past Medical History:   Diagnosis Date      Advanced care planning/counseling discussion     DNR and DNI     Chronic anticoagulation      Chronic systolic heart failure (H)      Coronary Artery Disease     Created by Conversion      Depression      Dyslipidemia, goal LDL below 70      Esophageal dysmotility      Essential hypertension      GERD (gastroesophageal reflux disease)      Glaucoma      History of DVT (deep vein thrombosis)      History of epistaxis      Microscopic colitis     on Asacol     Prior Myocardial Infarction      Retinal artery occlusion      Rheumatoid arthritis (H)      Type 2 Diabetes Mellitus     Created by Conversion            PHYSICAL EXAMINATION  Vitals:    10/03/18 2155   BP: 119/63   Pulse: 81   Resp: 18   Temp: 98.1  F (36.7  C)   SpO2: 94%   Weight: 139 lb (63 kg)       Today on physical exam:     GENERAL: Awake, Alert, oriented x3, not in any form of acute distress, answers questions appropriately, follows simple commands, conversant  HEENT: Head is normocephalic with normal hair distribution. No evidence of trauma. Ears: No acute purulent discharge. Eyes: Conjunctivae pink with no scleral jaundice. Nose: Normal mucosa and septum. NECK: Supple with no cervical or supraclavicular lymphadenopathy. Trachea is midline. Decreased vision Left eye  CHEST: No tenderness or deformity, no crepitus  LUNG: Dim to auscultation with good chest expansion. There are no crackles or wheezes, normal AP diameter.   BACK: No kyphosis of the thoracic spine. Tenderness over thoracic spine, back brace when out of bed  CVS: irregularly irregular rhythm, there are no murmurs, rubs, gallops, or heaves,  2+ pulses symmetric in all extremities.  ABDOMEN: Rounded and soft, nontender to palpation, non distended, no masses, no organomegaly, good bowel sounds, no rebound or guarding, no peritoneal signs.   EXTREMITIES: trace nonpitting ble, rue sling in place, 1+ edema RUE, slight tingling in 4th and 5th fingers right hand, ble numb/tingling  SKIN: Warm  and dry, no erythema noted.  Skin color, texture, no rashes or lesions.  NEUROLOGICAL: The patient is oriented to person, place and time. Strength and sensation are grossly intact. Face is symmetric.Anxious            LABS:   Recent Results (from the past 168 hour(s))   Basic Metabolic Panel   Result Value Ref Range    Sodium 141 136 - 145 mmol/L    Potassium 4.3 3.5 - 5.0 mmol/L    Chloride 103 98 - 107 mmol/L    CO2 27 22 - 31 mmol/L    Anion Gap, Calculation 11 5 - 18 mmol/L    Glucose 124 70 - 125 mg/dL    Calcium 9.0 8.5 - 10.5 mg/dL    BUN 23 8 - 28 mg/dL    Creatinine 0.99 0.60 - 1.10 mg/dL    GFR MDRD Af Amer >60 >60 mL/min/1.73m2    GFR MDRD Non Af Amer 53 (L) >60 mL/min/1.73m2   Vitamin D, Total (25-Hydroxy)   Result Value Ref Range    Vitamin D, Total (25-Hydroxy) 32.7 30.0 - 80.0 ng/mL   HM1 (CBC with Diff)   Result Value Ref Range    WBC 7.6 4.0 - 11.0 thou/uL    RBC 3.45 (L) 3.80 - 5.40 mill/uL    Hemoglobin 10.4 (L) 12.0 - 16.0 g/dL    Hematocrit 33.2 (L) 35.0 - 47.0 %    MCV 96 80 - 100 fL    MCH 30.1 27.0 - 34.0 pg    MCHC 31.3 (L) 32.0 - 36.0 g/dL    RDW 15.6 (H) 11.0 - 14.5 %    Platelets 250 140 - 440 thou/uL    MPV 10.0 8.5 - 12.5 fL    Neutrophils % 63 50 - 70 %    Lymphocytes % 24 20 - 40 %    Monocytes % 9 2 - 10 %    Eosinophils % 4 0 - 6 %    Basophils % 0 0 - 2 %    Neutrophils Absolute 4.7 2.0 - 7.7 thou/uL    Lymphocytes Absolute 1.8 0.8 - 4.4 thou/uL    Monocytes Absolute 0.7 0.0 - 0.9 thou/uL    Eosinophils Absolute 0.3 0.0 - 0.4 thou/uL    Basophils Absolute 0.0 0.0 - 0.2 thou/uL   Thyroid Stimulating Hormone (TSH)   Result Value Ref Range    TSH 2.73 0.30 - 5.00 uIU/mL     Results for orders placed or performed in visit on 10/01/18   Basic Metabolic Panel   Result Value Ref Range    Sodium 141 136 - 145 mmol/L    Potassium 4.3 3.5 - 5.0 mmol/L    Chloride 103 98 - 107 mmol/L    CO2 27 22 - 31 mmol/L    Anion Gap, Calculation 11 5 - 18 mmol/L    Glucose 124 70 - 125 mg/dL    Calcium  9.0 8.5 - 10.5 mg/dL    BUN 23 8 - 28 mg/dL    Creatinine 0.99 0.60 - 1.10 mg/dL    GFR MDRD Af Amer >60 >60 mL/min/1.73m2    GFR MDRD Non Af Amer 53 (L) >60 mL/min/1.73m2         Lab Results   Component Value Date    WBC 7.6 10/01/2018    HGB 10.4 (L) 10/01/2018    HCT 33.2 (L) 10/01/2018    MCV 96 10/01/2018     10/01/2018       Lab Results   Component Value Date    [id number] 399 02/27/2018     Lab Results   Component Value Date    HGBA1C 7.5 (H) 08/16/2018     Lab Results   Component Value Date    INR 1.39 (H) 02/23/2017    INR >13.50 (HH) 02/22/2017    INR >13.50 (HH) 02/21/2017     Vitamin D, Total (25-Hydroxy)   Date Value Ref Range Status   10/01/2018 32.7 30.0 - 80.0 ng/mL Final     Lab Results   Component Value Date    TSH 2.73 10/02/2018           ASSESSMENT/PLAN:    1. Fall, Right Humerual neck fracture: Pain controlled on norco, fentanyl patch, lidoderm patch, icy hot prn. F/u with ortho 10/15. NWB RUE, sling in place. Slight tingling 4th and 5th fingers. Monitor.   2. Acute compression fracture T6, chronic T9, T11, T12 compression fracture: Back brace when out of bed, f/u neurosurgery 10/12. Pain controlled on norco and fentanyl patch, lidoderm patch.  on calcitonin for osteoporosis. Changed am norco schedule to 0600 and pain improved. Ordered to take back brace off twice daily while resting in bed.   3. H/o Type 2 DM: Accuchecks 121-255, HGa1c 7.5 on 8/16. On diabetic diet, metformin.   4. HTN: SBP 110s. Increased amlodipine to 10mg and DBP improved. On lasix, losartan, metoprolol.   5. Osteoporosis: On vitamin d, calcitonin. F/u with osteoporosis clinic.   6. Depression: On fluoxetine, mirtazapine.   7. Atrial fibrillation: Rate controlled 80s on metoprolol. Previously on warfarin, looks like has been off for some time and no longer follows with cardiology.   8. Irritable bowel syndrome: controlled currently, asacol daily prn. Senekot daily prn.   9. CAD: On aspirin, atorvastatin. Nitrostat  prn. No further cardiology follow up. Chest pain episode few days ago relieved with nitro. No recurrence.   10. Allergic rhinitis: On flonase daily.   11. COPD: On flovent, shortness of breath at baseline. No cough.   12. GERD: On pantoprazole.   13. Hypokalemia: On kcl.   12. Diabetic neuropathy: On gabapentin  13. Elevated tsh: Suspect hypothyroid, was 6.3 on 6/8 and 14.78 on 8/16 but no thyroid medicine on chart. Will recheck 10/2-now back to normal 2.73.   14. DHAVAL on CKD stage 2: Cr 1.28 on 9/23. Will recheck on 10/1-0.99, stable.    15. Ischemic cardiomyopathy, CHF: Per records in august she told PCP did not want to f/u with cardiology any further. On lasix, metoprolol, losartan.   16. Anxiety: Agreeable to seeing psych in house.  17. Anemia of ckd: Hg 10.4 on 10/2. Monitor.      Per therapy soft LCD 10/15 max assist for toileting, ambulated 20 feet with hemiwalker and cga, assist of 1 for transfers. 24 on slums.     Electronically signed by: Libby Antony NP    Total 25 minutes of which 55% was spent in counseling and coordination of care of the above plan    Time spent in interview and examination of patient, review of available records, and discussion with nursing staff. Continue care plan, efforts at therapy, and monitor nutritional status.

## 2021-06-20 NOTE — PROGRESS NOTES
[facility]    DATE: 10/11/2018    NAME:  Marcela Presley             :  7/10/1934  MRN: [id number]  CODE STATUS:  FULL CODE    VISIT TYPE: Review Of Multiple Medical Conditions     FACILITY:  [facility] [[id number]]       CHIEF COMPLAIN/REASON FOR VISIT:    Chief Complaint   Patient presents with     Review Of Multiple Medical Conditions               HISTORY OF PRESENT ILLNESS: Marcela Presley is a 84 y.o. female who was admitted - for fall in bathroom at assisted living facility. She was found to have right sided surgical neck fracture with mild inferior subluxation of right humeral head without dislocation. She was also found to have acute T6 and chronic T9, T11, T12 compression fractures with T3-10 neural foraminal stenosis. Her pain was controlled on fentanyl patch with percocet. She was started on calcitonin for osteoporosis. She was discharged to TCU for further rehab. She has PMH of depression, Type 2 DM, A fib on warfarin, COPD, vitamin d deficiency, CAD, diabetic neuropathy. Prior to this she lived at [facility].     Today Ms. Presley states she has a care conference later this morning and that she is seriously wanting to go home this weekend. She is paying privately to stay here and feels she can get the same level of care at home as she can here. She says her back brace is still terribly uncomfortable. It is causing skin breakdown on her left abdomen and doesn't want to keep wearing it or if she does continue she would like some extra padding to help prevent further breakdown. She says the nurse put some aquaphor on it last night and that did help it feel better. She says her pain is doing very well on her schedule and she thinks she could try without the pain patch. She is willing to stop this because she doesn't think it is helping much with her pain anyways. She says she is walking with a cane now and doing much better with that  than the walker. She is able to get herself up and to the bathroom pretty much by herself as well. She denies any further dizziness and her breathing is stable. She has an appt tomorrow with the surgeon and is wondering what they will tell her. Discussed she sees neurosurgeon tomorrow who would determine if she can stop wearing the back brace. She is not sure if her assisted living staff will help put the brace on if she needs help with that when she returns home. She plans to call them today and ask about this.     REVIEW OF SYSTEMS:  PROBLEMS AND REVIEW OF SYSTEMS:   Review of Systems  Today on ROS:   Currently, no fever, chills, or rigors. Decreased vision and hearing. Denies any chest pain, headaches, palpitations. Appetite is good. Denies any GERD symptoms. Denies any difficulty with swallowing, nausea, or vomiting. No active bleeding. No rash. Positive for shortness of breath at times, no cough, urinary urgency, urinary incontinence, loose stools intermittently for chronic IBS, back brace when out of bed, back pain, walking with cane, excoriation from back brace on abdomen      Allergies   Allergen Reactions     Trazodone Anxiety     Brimonidine Unknown     From facility     Cefuroxime Unknown     From facility     Cefuroxime Axetil      Estradiol Unknown     From facility     Latanoprost Unknown     From facility     Lisinopril Unknown     From facility     Oxycodone Unknown     From facility     Ranitidine Unknown     FROM FACILITY     Current Outpatient Prescriptions   Medication Sig     amLODIPine (NORVASC) 5 MG tablet Take 10 mg by mouth daily.      aspirin 81 MG EC tablet Take 1 tablet (81 mg total) by mouth daily.     atorvastatin (LIPITOR) 40 MG tablet Take 40 mg by mouth at bedtime.     calcitonin, salmon, (MIACALCIN) 200 unit/actuation nasal spray Apply 1 spray into alternating nostrils daily.     cholecalciferol, vitamin D3, 1,000 unit tablet Take 1,000 Units by mouth daily.     erythromycin  ophthalmic ointment Administer 1 application into the left eye at bedtime.     FLUoxetine (PROZAC) 20 MG capsule Take 60 mg by mouth daily.     fluticasone (FLONASE) 50 mcg/actuation nasal spray Apply 2 sprays into each nostril daily.     fluticasone (FLOVENT HFA) 110 mcg/actuation inhaler Inhale 1 puff 2 (two) times a day.     food supplemt, lactose-reduced (ENSURE ORIGINAL) 0.04-1.05 gram-kcal/mL Liqd Take 1 Can by mouth 2 (two) times a day.      furosemide (LASIX) 40 MG tablet Take 40 mg by mouth 2 (two) times a day.     gabapentin (NEURONTIN) 300 MG capsule Take 300 mg by mouth 2 (two) times a day.      gabapentin (NEURONTIN) 600 MG tablet Take 600 mg by mouth daily.     HYDROcodone-acetaminophen 5-325 mg per tablet Take 2 tablets by mouth every morning.     HYDROcodone-acetaminophen 5-325 mg per tablet Take 1 tablet by mouth 3 (three) times a day. Takes at noon, 1700, and 2100     lidocaine (LIDODERM) 5 % Place 1 patch on the skin daily. Remove & Discard patch within 12 hours or as directed by MD     losartan (COZAAR) 25 MG tablet Take 25 mg by mouth daily.     MESALAMINE (ASACOL HD ORAL) Take 800 mg by mouth daily as needed.      metFORMIN (GLUCOPHAGE) 500 MG tablet Take 500 mg by mouth daily with breakfast.      methyl salicylate-menthol (ICY HOT) 30-10 % Crea Apply 1 application topically daily.     metoprolol tartrate (LOPRESSOR) 50 MG tablet Take 50 mg by mouth daily.     MIRTAZAPINE ORAL Take 37.5 mg by mouth at bedtime.     mupirocin (BACTROBAN) 2 % ointment Apply 1 application topically 2 (two) times a day as needed.     nitroglycerin (NITROSTAT) 0.4 MG SL tablet Place 0.4 mg under the tongue every 5 (five) minutes as needed (x 3 doses for chest pain).      pantoprazole (PROTONIX) 40 MG tablet Take 40 mg by mouth daily.      potassium chloride (KLOR-CON) 20 mEq packet Take 20 mEq by mouth daily.     senna-docusate (SENNOSIDES-DOCUSATE SODIUM) 8.6-50 mg tablet Take 1 tablet by mouth daily as needed for  constipation.     triamcinolone (KENALOG) 0.1 % cream Apply 1 application topically 2 (two) times a day as needed.     white petrolatum (AQUAPHOR ORIGINAL) 41 % Oint Apply 1 application topically 2 (two) times a day.     Past Medical History:    Past Medical History:   Diagnosis Date     Advanced care planning/counseling discussion     DNR and DNI     Chronic anticoagulation      Chronic systolic heart failure (H)      Coronary Artery Disease     Created by Conversion      Depression      Dyslipidemia, goal LDL below 70      Esophageal dysmotility      Essential hypertension      GERD (gastroesophageal reflux disease)      Glaucoma      History of DVT (deep vein thrombosis)      History of epistaxis      Microscopic colitis     on Asacol     Prior Myocardial Infarction      Retinal artery occlusion      Rheumatoid arthritis (H)      Type 2 Diabetes Mellitus     Created by Conversion            PHYSICAL EXAMINATION  Vitals:    10/10/18 2200   BP: 122/67   Pulse: 69   Resp: 18   Temp: 97.3  F (36.3  C)   SpO2: 91%   Weight: 137 lb (62.1 kg)       Today on physical exam:     GENERAL: Awake, Alert, oriented x3, not in any form of acute distress, answers questions appropriately, follows simple commands, conversant  HEENT: Head is normocephalic with normal hair distribution. No evidence of trauma. Ears: No acute purulent discharge. Eyes: Conjunctivae pink with no scleral jaundice. Nose: Normal mucosa and septum. NECK: Supple with no cervical or supraclavicular lymphadenopathy. Trachea is midline. Decreased vision Left eye  CHEST: No tenderness or deformity, no crepitus  LUNG: Dim to auscultation with good chest expansion. There are no crackles or wheezes, normal AP diameter.   BACK: No kyphosis of the thoracic spine. Tenderness over thoracic spine, back brace when out of bed  CVS: irregularly irregular rhythm, there are no murmurs, rubs, gallops, or heaves,  2+ pulses symmetric in all extremities.  ABDOMEN: Rounded and  soft, nontender to palpation, non distended, no masses, no organomegaly, good bowel sounds, no rebound or guarding, no peritoneal signs.   EXTREMITIES: trace nonpitting ble, rue sling in place, 1+ edema RUE, slight tingling in 4th and 5th fingers right hand, ble numb/tingling  SKIN: Warm and dry, excoriation to left abdomen from back brace  NEUROLOGICAL: The patient is oriented to person, place and time. Strength and sensation are grossly intact. Face is symmetric. Anxious at times            LABS:   No results found for this or any previous visit (from the past 168 hour(s)).  Results for orders placed or performed in visit on 10/01/18   Basic Metabolic Panel   Result Value Ref Range    Sodium 141 136 - 145 mmol/L    Potassium 4.3 3.5 - 5.0 mmol/L    Chloride 103 98 - 107 mmol/L    CO2 27 22 - 31 mmol/L    Anion Gap, Calculation 11 5 - 18 mmol/L    Glucose 124 70 - 125 mg/dL    Calcium 9.0 8.5 - 10.5 mg/dL    BUN 23 8 - 28 mg/dL    Creatinine 0.99 0.60 - 1.10 mg/dL    GFR MDRD Af Amer >60 >60 mL/min/1.73m2    GFR MDRD Non Af Amer 53 (L) >60 mL/min/1.73m2         Lab Results   Component Value Date    WBC 7.6 10/01/2018    HGB 10.4 (L) 10/01/2018    HCT 33.2 (L) 10/01/2018    MCV 96 10/01/2018     10/01/2018       Lab Results   Component Value Date    XMCWBNXT11 399 02/27/2018     Lab Results   Component Value Date    HGBA1C 7.5 (H) 08/16/2018     Lab Results   Component Value Date    INR 1.39 (H) 02/23/2017    INR >13.50 (HH) 02/22/2017    INR >13.50 (HH) 02/21/2017     Vitamin D, Total (25-Hydroxy)   Date Value Ref Range Status   10/01/2018 32.7 30.0 - 80.0 ng/mL Final     Lab Results   Component Value Date    TSH 2.73 10/02/2018           ASSESSMENT/PLAN:    1. Fall, Right Humerual neck fracture: Pain controlled on norco, fentanyl patch, lidoderm patch, icy hot prn. F/u with ortho 10/15. NWB RUE, sling in place. Slight tingling 4th and 5th fingers. Pain better will wean off fentanyl patch.   2. Acute  compression fracture T6, chronic T9, T11, T12 compression fracture: Back brace when out of bed, f/u neurosurgery 10/12. Pain controlled on norco and fentanyl patch, lidoderm patch.  on calcitonin for osteoporosis. Changed am norco schedule to 0600 and pain improved. Ordered to take back brace off twice daily while resting in bed. Will wean off fentanyl patch. Apply 12mcg patch today and remove after 3 days. Sees neurosurgeon tomorrow to see if still needs to wear back brace.   3. H/o Type 2 DM: Accuchecks 142-205, HGa1c 7.5 on 8/16. On diabetic diet, metformin.   4. HTN: SBP 120s. Increased amlodipine to 10mg and DBP improved. On lasix, losartan, metoprolol.   5. Osteoporosis: On vitamin d, calcitonin. F/u with osteoporosis clinic.   6. Depression: On fluoxetine, mirtazapine.   7. Atrial fibrillation: Rate controlled 60s on metoprolol. Previously on warfarin, looks like has been off for some time and no longer follows with cardiology.   8. Irritable bowel syndrome: controlled currently, asacol daily prn. Senekot daily prn. Back and forth between constipation and diarrhea.   9. CAD: On aspirin, atorvastatin. Nitrostat prn.   10. Allergic rhinitis: On flonase daily.   11. COPD: On flovent, shortness of breath at baseline. No cough.   12. GERD: On pantoprazole.   13. Hypokalemia: On kcl.   12. Diabetic neuropathy: On gabapentin  13. Elevated tsh: Suspect hypothyroid, was 6.3 on 6/8 and 14.78 on 8/16 but no thyroid medicine on chart. Will recheck 10/2-now back to normal 2.73.   14. DHAVAL on CKD stage 2: Cr 1.28 on 9/23. Will recheck on 10/1-0.99, stable.    15. Ischemic cardiomyopathy, CHF: Per records in august she told PCP did not want to f/u with cardiology any further. On lasix, metoprolol, losartan.   16. Anxiety: Agreeable to seeing psych in house.  17. Anemia of ckd: Hg 10.4 on 10/2. Monitor.   18. Excoriation: s/t back brace, ordered aquaphor to left abdomen daily and prn, cover with abd and paper tape for padding  under back brace.      Per therapy firm LCD 10/15 assist of 1 for toileting, ambulated 150 feet with quad cane and cga, assist of 1 for transfers. 24 on slums. [facility]. Care conference today.  PT, OT, HHA.     Electronically signed by: Libby Antony NP    Total 25 minutes of which 55% was spent in counseling and coordination of care of the above plan    Time spent in interview and examination of patient, review of available records, and discussion with nursing staff. Continue care plan, efforts at therapy, and monitor nutritional status.

## 2021-06-21 NOTE — PROGRESS NOTES
CHART NOTE     DATE OF SERVICE:  2018     : 7/10/1934   84 y.o.     ASSESSMENT :   Doing well. Min pain. Fx stable. Not wearing brace due to discomfort.      PLAN: Cont brace and restrictions. RTC 6 weeks with xrays, anticipate wean brace. Order to [facility] to refit or replace brace.    HPI:  Marcela Prelsey is w/ multiple comorbities presents after a fall 18 into her wall in her assisted living. She suffered a R humerus fracture that is being treated with immobilization. Thoracic CT revealed T6 fracture thought to be new, T9/T11/T12 fractures thought to be chronic. Placed in [facility], consulting neurosurgeon Dr. Bone.      TODAY, patient reports that she has not been wearing her brace for a couple of weeks. It does not fit well and is too uncomfortable.  She reports minimal back pain, RUE is more painful than back.  Resides at [facility].  Gets PT/OT.  Walking with canes which is very unstable. Would like to use her walker but says they need permission.        PAST MEDICAL HISTORY, SURGICAL HISTORY, REVIEW OF SYMPTOMS, MEDICATIONS AND ALLERGIES:  Past medical history, surgical history, ROS, medications and allergies reviewed with patient and remain unchanged from previous visit, except as noted in HPI.     Past Medical History:   Diagnosis Date     Advanced care planning/counseling discussion     DNR and DNI     Chronic anticoagulation      Chronic systolic heart failure (H)      Coronary Artery Disease     Created by Conversion      Depression      Dyslipidemia, goal LDL below 70      Esophageal dysmotility      Essential hypertension      GERD (gastroesophageal reflux disease)      Glaucoma      History of DVT (deep vein thrombosis)      History of epistaxis      Microscopic colitis     on Asacol     Prior Myocardial Infarction      Retinal artery occlusion      Rheumatoid arthritis (H)      Type 2 Diabetes Mellitus     Created by Conversion        Past Surgical History:   Procedure  Laterality Date     APPENDECTOMY        SECTION       CORONARY STENT PLACEMENT       EXPLORATORY LAPAROTOMY      for adhesions     EYE SURGERY      for retinal artery occlusion     HAND SURGERY       HYSTERECTOMY         Current Outpatient Medications   Medication Sig Dispense Refill     amLODIPine (NORVASC) 5 MG tablet Take 10 mg by mouth daily.        aspirin 81 MG EC tablet Take 1 tablet (81 mg total) by mouth daily.  0     atorvastatin (LIPITOR) 40 MG tablet Take 40 mg by mouth at bedtime.       calcitonin, salmon, (MIACALCIN) 200 unit/actuation nasal spray Apply 1 spray into alternating nostrils daily. 3.7 mL 0     cholecalciferol, vitamin D3, 1,000 unit tablet Take 1,000 Units by mouth daily.       erythromycin ophthalmic ointment Administer 1 application into the left eye at bedtime.       FLUoxetine (PROZAC) 20 MG capsule Take 60 mg by mouth daily.       fluticasone (FLONASE) 50 mcg/actuation nasal spray Apply 2 sprays into each nostril daily.       fluticasone (FLOVENT HFA) 110 mcg/actuation inhaler Inhale 1 puff 2 (two) times a day.       food supplemt, lactose-reduced (ENSURE ORIGINAL) 0.04-1.05 gram-kcal/mL Liqd Take 1 Can by mouth 2 (two) times a day.        furosemide (LASIX) 40 MG tablet Take 40 mg by mouth 2 (two) times a day.       gabapentin (NEURONTIN) 300 MG capsule Take 300 mg by mouth 2 (two) times a day.        gabapentin (NEURONTIN) 600 MG tablet Take 600 mg by mouth daily.       HYDROcodone-acetaminophen 5-325 mg per tablet Take 2 tablets by mouth every morning. 20 tablet 0     HYDROcodone-acetaminophen 5-325 mg per tablet Take 1 tablet by mouth 3 (three) times a day. Takes at noon, 1700, and 2100 20 tablet 0     lidocaine (LIDODERM) 5 % Place 1 patch on the skin daily. Remove & Discard patch within 12 hours or as directed by MD       losartan (COZAAR) 25 MG tablet Take 25 mg by mouth daily.       MESALAMINE (ASACOL HD ORAL) Take 800 mg by mouth daily as needed.        metFORMIN  "(GLUCOPHAGE) 500 MG tablet Take 500 mg by mouth daily with breakfast.        methyl salicylate-menthol (ICY HOT) 30-10 % Crea Apply 1 application topically daily.       metoprolol tartrate (LOPRESSOR) 50 MG tablet Take 50 mg by mouth daily.       MIRTAZAPINE ORAL Take 37.5 mg by mouth at bedtime.       mupirocin (BACTROBAN) 2 % ointment Apply 1 application topically 2 (two) times a day as needed.       nitroglycerin (NITROSTAT) 0.4 MG SL tablet Place 0.4 mg under the tongue every 5 (five) minutes as needed (x 3 doses for chest pain).        pantoprazole (PROTONIX) 40 MG tablet Take 40 mg by mouth daily.        potassium chloride (KLOR-CON) 20 mEq packet Take 20 mEq by mouth daily.       senna-docusate (SENNOSIDES-DOCUSATE SODIUM) 8.6-50 mg tablet Take 1 tablet by mouth daily as needed for constipation.       triamcinolone (KENALOG) 0.1 % cream Apply 1 application topically 2 (two) times a day as needed.       white petrolatum (AQUAPHOR ORIGINAL) 41 % Oint Apply 1 application topically 2 (two) times a day.       No current facility-administered medications for this visit.        PHYSICAL EXAM:    /55   Pulse (!) 58   Ht 5' 2\" (1.575 m)   Wt 136 lb (61.7 kg)   SpO2 93%   BMI 24.87 kg/m        Neurological exam reveals:  Respirations easy, non-labored.   Skin: W/D/I. No rashes, lesions or breaks in integrity.   Recent and remote memory intact, fund of knowledge wnl.    Alert and oriented x3, speech fluent and appropriate.   PERRL, EOMI, No nystagmus,   Face symmetric, tongue midline, Uvula midline,  palate rises with phonation   Shoulder shrug equal  Leg strength bilateral dorsiflexion, plantar flexion, and hip flexion 5/5 t exam  No extremity edema noted.   Muscle Bulk and tone wnl.   Reflexes: No pathological reflexes   Gait and station:not observed, in WC  NDI/JERRY: 58%      RADIOGRAPHIC IMAGING: XR: Severe compression fractures at T9, T11, and T12 are again noted. Moderate to severe compression deformity " at T6. These are unchanged. No definite new compression fractures      Films personally reviewed and interpreted.   Reviewed imaging with patient and family.

## 2021-06-21 NOTE — PROGRESS NOTES
[facility]    DATE: 10/12/2018    NAME:  Marcela Presley             :  7/10/1934  MRN: [id number]  CODE STATUS:  FULL CODE    VISIT TYPE: Discharge Summary     FACILITY:  [facility] [[id number]]       CHIEF COMPLAIN/REASON FOR VISIT:    Chief Complaint   Patient presents with     Discharge Summary               HISTORY OF PRESENT ILLNESS: Marcela Presley is a 84 y.o. female who was admitted - for fall in bathroom at assisted living facility. She was found to have right sided surgical neck fracture with mild inferior subluxation of right humeral head without dislocation. She was also found to have acute T6 and chronic T9, T11, T12 compression fractures with T3-10 neural foraminal stenosis. Her pain was controlled on fentanyl patch with percocet. She was started on calcitonin for osteoporosis. She was discharged to TCU for further rehab. She has PMH of depression, Type 2 DM, A fib on warfarin, COPD, vitamin d deficiency, CAD, diabetic neuropathy. Prior to this she lived at [facility].     TCU course:    Ms. Presley has made progress with therapy and is ambulating 150 feet with quad cane. She is stand by assist for transfers and modified independent for most ADLs. She does require min assist for some cares. She scored 24 on slums. She has been wearing her back brace and pain has been controlled. She was weaned off the fentanyl patch prior to discharge. She has follow up with neurosurgery today and will be told whether needs to continue wearing back brace or not. She did have some skin breakdown from brace and aquaphor was applied and extra padding provided to prevent further breakdown. Her blood sugars have been controlled. Her blood pressure was elevated and amlodipine was increased to 10mg. Her other vitals were stable. She did have elevated tsh in hospital 14.78 and recheck on 10/2 was wnl 2.73. Her renal function is stable at 0.99. Her weights  were stable and no signs of fluid overload. She was followed by in house psychologist for anxiety but not started on any medications. Her hg was stable at 10.4. She continues to be NWB of her Right upper extremity and has f/u with ortho on 10/15. She has been private pay during her stay and even though therapy recommended to stay longer she will be discharging on 10/14 due to financial reasons. She will be returning to her [facility] with additional services. She will have HH PT, OT, [facility]Ha. She will f/u with PCP in 5-7 days.       REVIEW OF SYSTEMS:  PROBLEMS AND REVIEW OF SYSTEMS:   Review of Systems  Today on ROS:   Currently, no fever, chills, or rigors. Decreased vision and hearing. Denies any chest pain, headaches, palpitations. Appetite is good. Denies any GERD symptoms. Denies any difficulty with swallowing, nausea, or vomiting. No active bleeding. No rash. Positive for shortness of breath at times, no cough, urinary urgency, urinary incontinence, loose stools intermittently for chronic IBS, back brace when out of bed, back pain, walking with cane      Allergies   Allergen Reactions     Trazodone Anxiety     Brimonidine Unknown     From facility     Cefuroxime Unknown     From facility     Cefuroxime Axetil      Estradiol Unknown     From facility     Latanoprost Unknown     From facility     Lisinopril Unknown     From facility     Oxycodone Unknown     From facility     Ranitidine Unknown     FROM FACILITY     Current Outpatient Prescriptions   Medication Sig     amLODIPine (NORVASC) 5 MG tablet Take 10 mg by mouth daily.      aspirin 81 MG EC tablet Take 1 tablet (81 mg total) by mouth daily.     atorvastatin (LIPITOR) 40 MG tablet Take 40 mg by mouth at bedtime.     calcitonin, salmon, (MIACALCIN) 200 unit/actuation nasal spray Apply 1 spray into alternating nostrils daily.     cholecalciferol, vitamin D3, 1,000 unit tablet Take 1,000 Units by mouth daily.     erythromycin ophthalmic ointment Administer  1 application into the left eye at bedtime.     FLUoxetine (PROZAC) 20 MG capsule Take 60 mg by mouth daily.     fluticasone (FLONASE) 50 mcg/actuation nasal spray Apply 2 sprays into each nostril daily.     fluticasone (FLOVENT HFA) 110 mcg/actuation inhaler Inhale 1 puff 2 (two) times a day.     food supplemt, lactose-reduced (ENSURE ORIGINAL) 0.04-1.05 gram-kcal/mL Liqd Take 1 Can by mouth 2 (two) times a day.      furosemide (LASIX) 40 MG tablet Take 40 mg by mouth 2 (two) times a day.     gabapentin (NEURONTIN) 300 MG capsule Take 300 mg by mouth 2 (two) times a day.      gabapentin (NEURONTIN) 600 MG tablet Take 600 mg by mouth daily.     HYDROcodone-acetaminophen 5-325 mg per tablet Take 2 tablets by mouth every morning.     HYDROcodone-acetaminophen 5-325 mg per tablet Take 1 tablet by mouth 3 (three) times a day. Takes at noon, 1700, and 2100     lidocaine (LIDODERM) 5 % Place 1 patch on the skin daily. Remove & Discard patch within 12 hours or as directed by MD     losartan (COZAAR) 25 MG tablet Take 25 mg by mouth daily.     MESALAMINE (ASACOL HD ORAL) Take 800 mg by mouth daily as needed.      metFORMIN (GLUCOPHAGE) 500 MG tablet Take 500 mg by mouth daily with breakfast.      methyl salicylate-menthol (ICY HOT) 30-10 % Crea Apply 1 application topically daily.     metoprolol tartrate (LOPRESSOR) 50 MG tablet Take 50 mg by mouth daily.     MIRTAZAPINE ORAL Take 37.5 mg by mouth at bedtime.     mupirocin (BACTROBAN) 2 % ointment Apply 1 application topically 2 (two) times a day as needed.     nitroglycerin (NITROSTAT) 0.4 MG SL tablet Place 0.4 mg under the tongue every 5 (five) minutes as needed (x 3 doses for chest pain).      pantoprazole (PROTONIX) 40 MG tablet Take 40 mg by mouth daily.      potassium chloride (KLOR-CON) 20 mEq packet Take 20 mEq by mouth daily.     senna-docusate (SENNOSIDES-DOCUSATE SODIUM) 8.6-50 mg tablet Take 1 tablet by mouth daily as needed for constipation.     triamcinolone  (KENALOG) 0.1 % cream Apply 1 application topically 2 (two) times a day as needed.     white petrolatum (AQUAPHOR ORIGINAL) 41 % Oint Apply 1 application topically 2 (two) times a day.     Past Medical History:    Past Medical History:   Diagnosis Date     Advanced care planning/counseling discussion     DNR and DNI     Chronic anticoagulation      Chronic systolic heart failure (H)      Coronary Artery Disease     Created by Conversion      Depression      Dyslipidemia, goal LDL below 70      Esophageal dysmotility      Essential hypertension      GERD (gastroesophageal reflux disease)      Glaucoma      History of DVT (deep vein thrombosis)      History of epistaxis      Microscopic colitis     on Asacol     Prior Myocardial Infarction      Retinal artery occlusion      Rheumatoid arthritis (H)      Type 2 Diabetes Mellitus     Created by Conversion            PHYSICAL EXAMINATION  Vitals:    10/12/18 1050   BP: 121/65   Pulse: 67   Resp: 16   Temp: 97.3  F (36.3  C)   SpO2: 97%   Weight: 136 lb (61.7 kg)       Today on physical exam:     GENERAL: Awake, Alert, oriented x3, not in any form of acute distress, answers questions appropriately, follows simple commands, conversant  HEENT: Head is normocephalic with normal hair distribution. No evidence of trauma. Ears: No acute purulent discharge. Eyes: Conjunctivae pink with no scleral jaundice. Nose: Normal mucosa and septum. NECK: Supple with no cervical or supraclavicular lymphadenopathy. Trachea is midline. Decreased vision Left eye  CHEST: No tenderness or deformity, no crepitus  LUNG: Dim to auscultation with good chest expansion. There are no crackles or wheezes, normal AP diameter.   BACK: No kyphosis of the thoracic spine. Tenderness over thoracic spine, back brace when out of bed  CVS: irregularly irregular rhythm, there are no murmurs, rubs, gallops, or heaves,  2+ pulses symmetric in all extremities.  ABDOMEN: Rounded and soft, nontender to palpation, non  distended, no masses, no organomegaly, good bowel sounds, no rebound or guarding, no peritoneal signs.   EXTREMITIES: trace nonpitting ble, rue sling in place, 1+ edema RUE, slight tingling in 4th and 5th fingers right hand, ble numb/tingling  SKIN: Warm and dry, excoriation to left abdomen from back brace  NEUROLOGICAL: The patient is oriented to person, place and time. Strength and sensation are grossly intact. Face is symmetric. Anxious at times            LABS:   No results found for this or any previous visit (from the past 168 hour(s)).  Results for orders placed or performed in visit on 10/01/18   Basic Metabolic Panel   Result Value Ref Range    Sodium 141 136 - 145 mmol/L    Potassium 4.3 3.5 - 5.0 mmol/L    Chloride 103 98 - 107 mmol/L    CO2 27 22 - 31 mmol/L    Anion Gap, Calculation 11 5 - 18 mmol/L    Glucose 124 70 - 125 mg/dL    Calcium 9.0 8.5 - 10.5 mg/dL    BUN 23 8 - 28 mg/dL    Creatinine 0.99 0.60 - 1.10 mg/dL    GFR MDRD Af Amer >60 >60 mL/min/1.73m2    GFR MDRD Non Af Amer 53 (L) >60 mL/min/1.73m2         Lab Results   Component Value Date    WBC 7.6 10/01/2018    HGB 10.4 (L) 10/01/2018    HCT 33.2 (L) 10/01/2018    MCV 96 10/01/2018     10/01/2018       Lab Results   Component Value Date    KHDJWUYH42 399 02/27/2018     Lab Results   Component Value Date    HGBA1C 7.5 (H) 08/16/2018     Lab Results   Component Value Date    INR 1.39 (H) 02/23/2017    INR >13.50 (HH) 02/22/2017    INR >13.50 (HH) 02/21/2017     Vitamin D, Total (25-Hydroxy)   Date Value Ref Range Status   10/01/2018 32.7 30.0 - 80.0 ng/mL Final     Lab Results   Component Value Date    TSH 2.73 10/02/2018           ASSESSMENT/PLAN:    1. Fall, Right Humerual neck fracture: Pain controlled on norco, fentanyl patch, lidoderm patch, icy hot prn. F/u with ortho 10/15. NWB RUE, sling in place. Slight tingling 4th and 5th fingers. Pain better will wean off fentanyl patch.   2. Acute compression fracture T6, chronic T9, T11,  T12 compression fracture: Back brace when out of bed, f/u neurosurgery 10/12. Pain controlled on norco and fentanyl patch, lidoderm patch.  on calcitonin for osteoporosis. Changed am norco schedule to 0600 and pain improved. Ordered to take back brace off twice daily while resting in bed. Weaned off fentanyl patch.   3. H/o Type 2 DM: Accuchecks 142-205, HGa1c 7.5 on 8/16. On diabetic diet, metformin.   4. HTN: SBP 120s. Increased amlodipine to 10mg and DBP improved. On lasix, losartan, metoprolol.   5. Osteoporosis: On vitamin d, calcitonin. F/u with osteoporosis clinic.   6. Depression: On fluoxetine, mirtazapine.   7. Atrial fibrillation: Rate controlled 60s on metoprolol. Previously on warfarin, looks like has been off for some time and no longer follows with cardiology.   8. Irritable bowel syndrome: controlled currently, asacol daily prn. Senekot daily prn. Back and forth between constipation and diarrhea.   9. CAD: On aspirin, atorvastatin. Nitrostat prn.   10. Allergic rhinitis: On flonase daily.   11. COPD: On flovent, shortness of breath at baseline. No cough.   12. GERD: On pantoprazole.   13. Hypokalemia: On kcl.   12. Diabetic neuropathy: On gabapentin  13. Elevated tsh: Suspect hypothyroid, was 6.3 on 6/8 and 14.78 on 8/16 but no thyroid medicine on chart. recheck 10/2-now back to normal 2.73. Resolved.   14. DHAVAL on CKD stage 2: Cr 1.28 on 9/23. recheck on 10/1-0.99, stable.    15. Ischemic cardiomyopathy, CHF: Per records in august she told PCP did not want to f/u with cardiology any further. On lasix, metoprolol, losartan.   16. Anxiety: Agreeable to seeing psych in house.  17. Anemia of ckd: Hg 10.4 on 10/2. Monitor.   18. Excoriation: s/t back brace, ordered aquaphor to left abdomen daily and prn, cover with abd and paper tape for padding under back brace.        Electronically signed by: Libby Antony NP    Total 35 minutes of which 55% was spent in counseling and coordination of care of the  "above plan    Time spent in interview and examination of patient, review of available records, and discussion with nursing staff. Continue care plan, efforts at therapy, and monitor nutritional status.        Please evaluate Marcela Presley for admission to Home Health.    Face to Face Attestation and Initial Plan of Care    The face-to-face encounter occurred on date: 10/12/18  Face to Face encounter was with: Libby Cassia    Please provide brief clinical summary of reason for visit and need for home care. Deconditioning after hospital stay for fall, right humeral neck fracture, acute compression fracture t6    Please identify which of the following home health disciplines the patient will need AND describe the skilled services that you would like the home health agency to perform: PHYSICAL THERAPY: strength training and gait training, OCCUPATIONAL THERAPY: ADLs and home safety and HOME HEALTH AIDE    Homebound Status (describe the functional limitations that support this patient is confined to his/her home. Medicaid recipients are not required to be homebound.):assistive device needed:  cane, assist of 1, right arm fracture and back fracture    Name of physician who will be responsible for the ongoing home health plan of care (CMS requires the referring physician to provide the specific name of the community physician instead of a title, such as \"PCP\"): Paul Bloomberg, MD    Requested Start of Care Date: Within 48 hours    Other information to assist the home health agency in developing the initial Plan of Care:    I certify that services are/were furnished while this patient was under the care of a physician and that a physician or an allowed non-physician practitioner (NPP), had a face-to-face encounter that meets the physician face-to-face encounter requirements. The encounter was in whole, or in part, related to the primary reason for home health. The patient is confined to his/her home and needs intermittent " skilled nursing, physical therapy, speech-language pathology, or the continued need for occupational therapy. A plan of care has been established by a physician and is periodically reviewed by a physician.

## 2021-06-21 NOTE — PROGRESS NOTES
NEUROSURGERY FOLLOW UP EVALUATION:    ASSESSMENT/ PLAN:  Marcela Presley is a 84 y.o. female, with multiple comorbities presents after a fall 9/23/18 into her wall in her assisted living. She suffered a humerus fracture that is being treated with immobilization. Thoracic CT reveals T6 fracture thought to be new, T9/T11/T12 fractures thought to be chronic.    She is treated in a TLSO.    1.  Continue TLSO when up.  2.  Repeat xray in 4 weeks.  3.  Osteoporosis evaluation and treatment.  4.  Call [phone] with the following symptoms:  *Worsening pain   *Worsening headache not relieved by the prescription given  *A headache that gets better or worse when you stand up or lie down  *Seizures  *Persistent nausea and vomiting  *Increased sleepiness or difficulty being awakened  *Change in ability to walk, see, think, or talk  *Worsening or new onset of weakness, or numbness and tingling  *Loss or change in your ability to control bowel or bladder function   5.  Activity  *No lifting, pushing or pulling greater than 5-10 pound (this is about a gallon of milk).  *No repetitive bending, twisting, or jarring activities  *No overhead work  *No aerobic or strenuous activity  *No activities with increased risk of falls  *You may move about your home as tolerated  *You may walk up and down stairs as tolerated  6.  WEARING THE LUMBAR BRACE:  * Wear the brace when out of bed or sitting upright in bed. You may take the brace off when you sit down for all of your meals.   * You should apply the brace before standing.  * You may shower seated without brace.   Sit down on shower chair, remove brace   Shower   Dry   Re-apply brace before standing.   Take care not to fall  *If your brace is not fitting call  Orthotist at [place].  *Check the skin under your brace at least daily.      Today she reports improvement in her back pain but persists some. Has right arm pain from fracture which is in a sling. She was started on Vitamin D  and is using nasal spray for her osteoporosis.    EXAM:      Alert and oriented x3, speech clear  Arm strength: 5/5 left. Right arm in sling.  Leg strength: 5/5   Bulk and tone: normal       IMAGING:  The imaging was personally reviewed by me.   FINDINGS: 50% T6, 80% T9, 90% T11 and 90% T12 compression are again identified. No new vertebral body height loss. Stable kyphosis.    Arlet Moseley NP  [facility]

## 2021-06-26 NOTE — PROGRESS NOTES
Progress Notes by France Spence MD at 9/27/2018 11:59 PM     Author: France Spence MD Service: -- Author Type: Physician    Filed: 10/1/2018 10:06 PM Encounter Date: 9/27/2018 Status: Signed    : France Spence MD (Physician)       [facility]      Facility:    [facility] [[id number]]    Code Status: FULL CODE  -      9/23  - 9/26/18            Chief Complaint/Reason for Visit:  Chief Complaint   Patient presents with   ? H & P     right humerus fx  and T6 compression after a fall       HPI:   Marcela is a 84 y.o. female with a history of diabetes type 2, atrial fibrillation on warfarin, COPD, coronary artery disease, diabetic neuropathy, vitamin D deficiency, and depression.      Marcela fell in the BR of her assisted living apartment. She did not pass out, she did not hit her head. She had immediate and severe pain in her right shoulder and her upper back.  Imaging showed a  surgical neck fracture of the right humeral head without dislocation, but mild inferior subluxation.  She was fitted with an around the chest type shoulder immobilizer, with no shoulder movement for about 2 weeks, but okay active elbow and hand motions.  Recommended immobilization with a hemiwalker with physical therapy.      She also had an acute T6 with a 40% compression as well as chronic T9, T11, and T12 compression fractures.  There was foraminal stenosis T3 through T10, and thoracic kyphosis.  Neurosurgery recommended TLSO to be used when out of bed.    Her pain was treated with fentanyl patch and Percocet.  She was started on calcitonin for osteoporosis.    She had also fallen 1-2 months previous to this that resulted in back pain.  Used to be on Coumadin but that has been discontinued within the last year due to her frequent falls.        Past Medical History:  Past Medical History:   Diagnosis Date   ? Advanced care planning/counseling  discussion     DNR and DNI   ? Chronic anticoagulation    ? Chronic systolic heart failure (H)    ? Coronary Artery Disease     Created by Conversion    ? Depression    ? Dyslipidemia, goal LDL below 70    ? Esophageal dysmotility    ? Essential hypertension    ? GERD (gastroesophageal reflux disease)    ? Glaucoma    ? History of DVT (deep vein thrombosis)    ? History of epistaxis    ? Microscopic colitis     on Asacol   ? Prior Myocardial Infarction    ? Retinal artery occlusion    ? Rheumatoid arthritis (H)    ? Type 2 Diabetes Mellitus     Created by Conversion            Surgical History:  Past Surgical History:   Procedure Laterality Date   ? APPENDECTOMY     ?  SECTION     ? CORONARY STENT PLACEMENT     ? EXPLORATORY LAPAROTOMY      for adhesions   ? EYE SURGERY      for retinal artery occlusion   ? HAND SURGERY     ? HYSTERECTOMY         Family History:   Family History   Problem Relation Age of Onset   ? Acute Myocardial Infarction Mother       age 77   ? Diabetes Mother    ? Hypertension Mother    ? Acute Myocardial Infarction Father       age 87   ? Diabetes Father    ? Hypertension Father    ? Sleep apnea Sister    ? Diabetes Paternal Uncle    ? Suicidality Son        Social History:    Social History     Social History   ? Marital status:      Spouse name: N/A   ? Number of children: N/A   ? Years of education: N/A     Social History Main Topics   ? Smoking status: Never Smoker   ? Smokeless tobacco: Not on file   ? Alcohol use No   ? Drug use: No   ? Sexual activity: Not on file     Other Topics Concern   ? Not on file     Social History Narrative    She lives alone in an assisted living [facility].  She has 2 children.  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  She has irritable bowel, has both diarrhea and constipation, at home she tended to have more of the looser unformed stool.  3 weeks ago she had a bowel blockage, but that has settled down  With the brace on the pain is mild, mostly in the  R upper arm, some in her mid back  Remainder of the comprehensive review of systems is negative    Vitals:    09/27/18 0900   BP: (!) 155/94   Pulse: 81   Resp: 16   Temp: 97.6  F (36.4  C)   SpO2: 92%       Physical Exam   Constitutional: She is oriented to person, place, and time. She appears well-nourished. No distress.   Pleasant, sharp elderly  female   HENT:   Nose: Nose normal.   Mouth/Throat: Oropharynx is clear and moist.   Eyes: Conjunctivae are normal. No scleral icterus.   Left eye prosthesis   Cardiovascular: Regular rhythm and normal heart sounds.    No murmur heard.  Abdominal: Soft. Bowel sounds are normal. She exhibits no distension. There is no tenderness.   Musculoskeletal:   Wearing the TLSO under her chest- arm immobilizer  Able to get up unassisted from sit to stand and transfer into the wheelchair for me with contact guard only  Left arm and both legs normal to exam  Mild edema of the right fingers   Lymphadenopathy:     She has no cervical adenopathy.   Neurological: She is alert and oriented to person, place, and time. She has normal reflexes. Coordination normal.   Skin: Skin is warm and dry. No rash noted.   Bruising of the upper right arm laterally and posteriorly   Psychiatric: She has a normal mood and affect. Her behavior is normal. Judgment and thought content normal.       Allergies   Allergen Reactions   ? Trazodone Anxiety   ? Brimonidine Unknown     From facility   ? Cefuroxime Unknown     From facility   ? Cefuroxime Axetil    ? Estradiol Unknown     From  facility   ? Latanoprost Unknown     From facility   ? Lisinopril Unknown     From facility   ? Oxycodone Unknown     From facility   ? Ranitidine Unknown     FROM FACILITY         Medication List:  Current Outpatient Prescriptions   Medication Sig   ? amLODIPine (NORVASC) 5 MG tablet Take 10 mg by mouth daily.    ? aspirin 81 MG EC tablet Take 1 tablet (81 mg total) by mouth daily.   ? atorvastatin (LIPITOR) 40 MG tablet Take 40 mg by mouth at bedtime.   ? calcitonin, salmon, (MIACALCIN) 200 unit/actuation nasal spray Apply 1 spray into alternating nostrils daily.   ? cholecalciferol, vitamin D3, 1,000 unit tablet Take 1,000 Units by mouth daily.   ? erythromycin ophthalmic ointment Administer 1 application into the left eye at bedtime.   ? fentaNYL (DURAGESIC) 25 mcg/hr Place 1 patch on the skin every third day.   ? FLUoxetine (PROZAC) 20 MG capsule Take 60 mg by mouth daily.   ? fluticasone (FLONASE) 50 mcg/actuation nasal spray Apply 2 sprays into each nostril daily.   ? fluticasone (FLOVENT HFA) 110 mcg/actuation inhaler Inhale 1 puff 2 (two) times a day.   ? food supplemt, lactose-reduced (ENSURE ORIGINAL) 0.04-1.05 gram-kcal/mL Liqd Take 1 Can by mouth 2 (two) times a day.    ? furosemide (LASIX) 40 MG tablet Take 40 mg by mouth 2 (two) times a day.   ? gabapentin (NEURONTIN) 300 MG capsule Take 300 mg by mouth 2 (two) times a day.    ? gabapentin (NEURONTIN) 600 MG tablet Take 600 mg by mouth daily.   ? HYDROcodone-acetaminophen 5-325 mg per tablet Take 2 tablets by mouth every morning.   ? HYDROcodone-acetaminophen 5-325 mg per tablet Take 1 tablet by mouth 3 (three) times a day. Takes at noon, 1700, and 2100   ? lidocaine (LIDODERM) 5 % Place 1 patch on the skin daily. Remove & Discard patch within 12 hours or as directed by MD   ? losartan (COZAAR) 25 MG tablet Take 25 mg by mouth daily.   ? MESALAMINE (ASACOL HD ORAL) Take 800 mg by mouth daily as needed.    ? metFORMIN (GLUCOPHAGE) 500 MG tablet Take  500 mg by mouth daily with breakfast.    ? methyl salicylate-menthol (ICY HOT) 30-10 % Crea Apply 1 application topically daily.   ? metoprolol tartrate (LOPRESSOR) 50 MG tablet Take 50 mg by mouth daily.   ? MIRTAZAPINE ORAL Take 37.5 mg by mouth at bedtime.   ? mupirocin (BACTROBAN) 2 % ointment Apply 1 application topically 2 (two) times a day as needed.   ? nitroglycerin (NITROSTAT) 0.4 MG SL tablet Place 0.4 mg under the tongue every 5 (five) minutes as needed (x 3 doses for chest pain).    ? pantoprazole (PROTONIX) 40 MG tablet Take 40 mg by mouth daily.    ? potassium chloride (KLOR-CON) 20 mEq packet Take 20 mEq by mouth daily.   ? senna-docusate (SENNOSIDES-DOCUSATE SODIUM) 8.6-50 mg tablet Take 1 tablet by mouth daily as needed for constipation.   ? triamcinolone (KENALOG) 0.1 % cream Apply 1 application topically 2 (two) times a day as needed.   ? white petrolatum (AQUAPHOR ORIGINAL) 41 % Oint Apply 1 application topically 2 (two) times a day.       Labs:     Ref Range & Units 9/23/18  8:40 AM     WBC 4.0 - 11.0 thou/uL 7.5   Hemoglobin 12.0 - 16.0 g/dL 11.1 (L)   Platelets 140 - 440 thou/uL 143           Ref Range & Units 9/23/18  8:40 AM      Sodium 136 - 145 mmol/L 140    Potassium 3.5 - 5.0 mmol/L 4.1    Chloride 98 - 107 mmol/L 105    CO2 22 - 31 mmol/L 27    Anion Gap, Calculation 5 - 18 mmol/L 8    Glucose 70 - 125 mg/dL 131 (H)    Calcium 8.5 - 10.5 mg/dL 9.0    BUN 8 - 28 mg/dL 25    Creatinine 0.60 - 1.10 mg/dL 1.28 (H)    GFR MDRD Non Af Amer >60 mL/min/1.73m2 40 (L)           Assessment / Plan:    ICD-10-CM    1. Closed fracture of proximal end of right humerus with routine healing, unspecified fracture morphology, subsequent encounter S42.201D Fentanyl patch started, has NOrco   2. Wedge compression fracture of T6 vertebra (H) S22.050A Started on calcitonin, on Vit D, Lidoderm patch   3. Hypertension I10 Good control with Amlodipine and metoprolol   4. Type 2 diabetes mellitus with  hyperglycemia, without long-term current use of insulin (H) E11.65 Glucophage   5. Diabetic polyneuropathy associated with type 2 diabetes mellitus (H) E11.42 Use temi walker; on Gabapentin which may help fracture pain somewhat   6. Coronary Artery Disease I25.10 metoprolol   7. Chronic systolic heart failure (H) I50.22 Lasix   8. Retinal artery occlusion H34.9    9. Major depressive disorder with single episode, in partial remission (H) F32.4 Agree with Dr Higuera that the Prozac and Remeron doses are high.           Electronically signed by: France Spence MD

## 2021-07-03 NOTE — ADDENDUM NOTE
Addendum Note by Fariba Trivedi CNP at 11/12/2018 10:30 AM     Author: Fariba Trivedi CNP Service: -- Author Type: Nurse Practitioner    Filed: 11/12/2018 11:04 AM Encounter Date: 11/12/2018 Status: Signed    : Fariba Trivedi CNP (Nurse Practitioner)    Addended by: FARIBA TRIVEDI on: 11/12/2018 11:04 AM        Modules accepted: Orders

## 2021-07-14 PROBLEM — N17.9 AKI (ACUTE KIDNEY INJURY) (H): Status: RESOLVED | Noted: 2017-02-21 | Resolved: 2017-02-22

## 2021-07-14 PROBLEM — I50.9 CHF EXACERBATION (H): Status: RESOLVED | Noted: 2017-01-10 | Resolved: 2017-01-10

## 2021-07-14 PROBLEM — E11.8 CONTROLLED DIABETES MELLITUS TYPE 2 WITH COMPLICATIONS (H): Status: RESOLVED | Noted: 2017-01-10 | Resolved: 2017-02-22

## 2021-07-14 PROBLEM — I50.23 ACUTE ON CHRONIC SYSTOLIC HEART FAILURE (H): Status: RESOLVED | Noted: 2017-01-10 | Resolved: 2017-02-22

## 2021-07-14 PROBLEM — N17.9 ACUTE RENAL FAILURE (H): Status: RESOLVED | Noted: 2017-02-22 | Resolved: 2018-09-30

## 2021-07-21 ENCOUNTER — RECORDS - HEALTHEAST (OUTPATIENT)
Dept: ADMINISTRATIVE | Facility: CLINIC | Age: 86
End: 2021-07-21